# Patient Record
Sex: FEMALE | Race: WHITE | NOT HISPANIC OR LATINO | Employment: FULL TIME | URBAN - METROPOLITAN AREA
[De-identification: names, ages, dates, MRNs, and addresses within clinical notes are randomized per-mention and may not be internally consistent; named-entity substitution may affect disease eponyms.]

---

## 2021-06-16 ENCOUNTER — OFFICE VISIT (OUTPATIENT)
Dept: FAMILY MEDICINE CLINIC | Facility: CLINIC | Age: 31
End: 2021-06-16
Payer: COMMERCIAL

## 2021-06-16 VITALS
DIASTOLIC BLOOD PRESSURE: 80 MMHG | SYSTOLIC BLOOD PRESSURE: 110 MMHG | HEART RATE: 88 BPM | WEIGHT: 124.2 LBS | HEIGHT: 61 IN | OXYGEN SATURATION: 99 % | RESPIRATION RATE: 14 BRPM | BODY MASS INDEX: 23.45 KG/M2 | TEMPERATURE: 97.6 F

## 2021-06-16 DIAGNOSIS — J01.10 ACUTE NON-RECURRENT FRONTAL SINUSITIS: Primary | ICD-10-CM

## 2021-06-16 PROCEDURE — 99203 OFFICE O/P NEW LOW 30 MIN: CPT | Performed by: FAMILY MEDICINE

## 2021-06-16 RX ORDER — TRIAMCINOLONE ACETONIDE 1 MG/G
CREAM TOPICAL 2 TIMES DAILY
COMMUNITY

## 2021-06-16 RX ORDER — FLUCONAZOLE 150 MG/1
150 TABLET ORAL ONCE
Qty: 1 TABLET | Refills: 0 | Status: SHIPPED | OUTPATIENT
Start: 2021-06-16 | End: 2021-06-16

## 2021-06-16 RX ORDER — VALACYCLOVIR HYDROCHLORIDE 500 MG/1
500 TABLET, FILM COATED ORAL AS NEEDED
COMMUNITY
Start: 2021-05-07

## 2021-06-16 RX ORDER — AMOXICILLIN AND CLAVULANATE POTASSIUM 875; 125 MG/1; MG/1
1 TABLET, FILM COATED ORAL EVERY 12 HOURS SCHEDULED
Qty: 14 TABLET | Refills: 0 | Status: SHIPPED | OUTPATIENT
Start: 2021-06-16 | End: 2021-06-23

## 2021-06-16 RX ORDER — NORETHINDRONE ACETATE AND ETHINYL ESTRADIOL, ETHINYL ESTRADIOL AND FERROUS FUMARATE 1MG-10(24)
1 KIT ORAL DAILY
COMMUNITY
Start: 2021-03-30

## 2021-06-16 RX ORDER — ALPRAZOLAM 0.25 MG/1
0.25 TABLET ORAL AS NEEDED
COMMUNITY
Start: 2021-05-05

## 2021-06-16 RX ORDER — FLUTICASONE PROPIONATE 50 MCG
1 SPRAY, SUSPENSION (ML) NASAL DAILY
Qty: 9.9 ML | Refills: 0 | Status: SHIPPED | OUTPATIENT
Start: 2021-06-16 | End: 2021-07-08

## 2021-06-16 RX ORDER — ESCITALOPRAM OXALATE 20 MG/1
20 TABLET ORAL DAILY
COMMUNITY
Start: 2021-05-15

## 2021-06-16 NOTE — PROGRESS NOTES
Assessment/Plan:    1  Acute non-recurrent frontal sinusitis  -     amoxicillin-clavulanate (Augmentin) 875-125 mg per tablet; Take 1 tablet by mouth every 12 (twelve) hours for 7 days  -     fluconazole (DIFLUCAN) 150 mg tablet; Take 1 tablet (150 mg total) by mouth once for 1 dose  -     fluticasone (FLONASE) 50 mcg/act nasal spray; 1 spray into each nostril daily    Advised to try flonase, if no improvement augmentin given  Side effects and precautions reviewed  Patient states gets yeast infection after abx, would like sometimes  One time dose given  If worsening symptoms, patient should call  Due for physical            Return if symptoms worsen or fail to improve  Subjective:      Patient ID: Amanda Salazar is a 31 y o  female  Chief Complaint   Patient presents with    sinus pressure/pain     ear pressure headache, irritates throat from post nasal drip       HPI  Sinusitis  Sinus Pain  Patient complains of facial pain, headaches, nasal congestion, post nasal drip, sinus pressure and ear pain, eye pain on both sides   Onset of symptoms was 5 days ago  Symptoms have been gradually worsening since that time  She is drinking plenty of fluids  Past history is significant for nothing  Patient is non-smoker  Tried tylenol sinus and headache with no relief  Has had a sinus infection prior  The following portions of the patient's history were reviewed and updated as appropriate: allergies, current medications, past family history, past medical history, past social history, past surgical history and problem list     Review of Systems   Constitutional: Negative for appetite change and fever  HENT: Positive for congestion, ear pain, postnasal drip, sinus pressure, sinus pain and sore throat  Eyes: Negative for visual disturbance  Respiratory: Negative for cough and shortness of breath  Cardiovascular: Negative for chest pain and leg swelling  Gastrointestinal: Positive for nausea   Negative for abdominal pain, diarrhea and vomiting  Genitourinary: Negative for dysuria  Skin: Negative for color change  Neurological: Positive for headaches  Negative for dizziness, tremors and light-headedness  Psychiatric/Behavioral: Negative for agitation and behavioral problems  Current Outpatient Medications   Medication Sig Dispense Refill    ALPRAZolam (XANAX) 0 25 mg tablet Take 0 25 mg by mouth as needed      escitalopram (LEXAPRO) 20 mg tablet Take 20 mg by mouth daily      hydrocortisone 2 5 % ointment APPLY A THIN LAYER TO THE AFFECTED AREA(S) 2 TIMES A DAY MON FRI, STOP WHEN CLEAR      Lo Loestrin Fe 1 MG-10 MCG / 10 MCG TABS Take 1 tablet by mouth daily      triamcinolone (KENALOG) 0 1 % cream Apply topically 2 (two) times a day      valACYclovir (VALTREX) 500 mg tablet Take 500 mg by mouth as needed      amoxicillin-clavulanate (Augmentin) 875-125 mg per tablet Take 1 tablet by mouth every 12 (twelve) hours for 7 days 14 tablet 0    fluticasone (FLONASE) 50 mcg/act nasal spray 1 spray into each nostril daily 9 9 mL 0     No current facility-administered medications for this visit  Objective:    /80 (BP Location: Left arm, Patient Position: Sitting, Cuff Size: Standard)   Pulse 88   Temp 97 6 °F (36 4 °C) (Temporal)   Resp 14   Ht 5' 1" (1 549 m)   Wt 56 3 kg (124 lb 3 2 oz)   SpO2 99%   BMI 23 47 kg/m²        Physical Exam  Constitutional:       General: She is not in acute distress  Appearance: She is well-developed  HENT:      Head: Normocephalic and atraumatic  Right Ear: Tympanic membrane and external ear normal       Left Ear: Tympanic membrane and external ear normal       Nose: Nose normal       Mouth/Throat:      Pharynx: Oropharynx is clear  No oropharyngeal exudate  Eyes:      General:         Right eye: No discharge  Left eye: No discharge        Conjunctiva/sclera: Conjunctivae normal    Cardiovascular:      Rate and Rhythm: Normal rate and regular rhythm  Pulses: Normal pulses  Heart sounds: Normal heart sounds  No murmur heard  Pulmonary:      Effort: Pulmonary effort is normal  No respiratory distress  Breath sounds: Normal breath sounds  Abdominal:      General: Bowel sounds are normal  There is no distension  Palpations: Abdomen is soft  Tenderness: There is no abdominal tenderness  Musculoskeletal:         General: Normal range of motion  Cervical back: Normal range of motion and neck supple  Right lower leg: No edema  Left lower leg: No edema  Lymphadenopathy:      Cervical: No cervical adenopathy  Skin:     General: Skin is warm  Neurological:      Mental Status: She is alert and oriented to person, place, and time  Mental status is at baseline     Psychiatric:         Mood and Affect: Mood normal          Behavior: Behavior normal                 Kiara Paz MD

## 2021-06-28 ENCOUNTER — OFFICE VISIT (OUTPATIENT)
Dept: FAMILY MEDICINE CLINIC | Facility: CLINIC | Age: 31
End: 2021-06-28
Payer: COMMERCIAL

## 2021-06-28 VITALS
DIASTOLIC BLOOD PRESSURE: 72 MMHG | SYSTOLIC BLOOD PRESSURE: 114 MMHG | HEIGHT: 61 IN | BODY MASS INDEX: 23.45 KG/M2 | TEMPERATURE: 98.2 F | OXYGEN SATURATION: 98 % | WEIGHT: 124.2 LBS | RESPIRATION RATE: 16 BRPM | HEART RATE: 91 BPM

## 2021-06-28 DIAGNOSIS — H65.93 FLUID LEVEL BEHIND TYMPANIC MEMBRANE OF BOTH EARS: Primary | ICD-10-CM

## 2021-06-28 DIAGNOSIS — J34.2 DEVIATED SEPTUM: ICD-10-CM

## 2021-06-28 DIAGNOSIS — J32.9 SINUSITIS, UNSPECIFIED CHRONICITY, UNSPECIFIED LOCATION: ICD-10-CM

## 2021-06-28 PROCEDURE — 99213 OFFICE O/P EST LOW 20 MIN: CPT | Performed by: FAMILY MEDICINE

## 2021-06-28 RX ORDER — METHYLPREDNISOLONE 4 MG/1
TABLET ORAL
Qty: 21 EACH | Refills: 0 | Status: SHIPPED | OUTPATIENT
Start: 2021-06-28 | End: 2022-02-01

## 2021-06-28 RX ORDER — DOXYCYCLINE HYCLATE 100 MG/1
100 CAPSULE ORAL EVERY 12 HOURS SCHEDULED
Qty: 14 CAPSULE | Refills: 0 | Status: SHIPPED | OUTPATIENT
Start: 2021-06-28 | End: 2021-07-05

## 2021-06-28 RX ORDER — FLUCONAZOLE 150 MG/1
TABLET ORAL
COMMUNITY
Start: 2021-06-16 | End: 2021-12-10

## 2021-06-28 NOTE — PROGRESS NOTES
Hanna Gordon 1990 female MRN: [id number]    [address] [place] [address]      ASSESSMENT/PLAN  Hanna Gordon is a 31 y o  female presents to the office for    Diagnoses and all orders for this visit:    Fluid level behind tympanic membrane of both ears  -     Ambulatory Referral to Otolaryngology; Future  -     doxycycline hyclate (VIBRAMYCIN) 100 mg capsule; Take 1 capsule (100 mg total) by mouth every 12 (twelve) hours for 7 days  -     methylPREDNISolone 4 MG tablet therapy pack; Use as directed on package    Sinusitis, unspecified chronicity, unspecified location  -     Ambulatory Referral to Otolaryngology; Future  -     doxycycline hyclate (VIBRAMYCIN) 100 mg capsule; Take 1 capsule (100 mg total) by mouth every 12 (twelve) hours for 7 days  -     methylPREDNISolone 4 MG tablet therapy pack; Use as directed on package    Deviated septum    Other orders  -     fluconazole (DIFLUCAN) 150 mg tablet       Sinus Infection:  - Started on Doxy BID x 7days  - Use Flonase 1 spray in each nostril for 7 days  - Start taking Xzyal 5mg x 7 days  - Encourage to take hot shower to help with congestion   - recommend establishing with ENT given her history of nose trauma    If symptoms worsen to please contact the office  No future appointments  SUBJECTIVE  CC: Sore Throat (sinus, post nasal drip yellow/green, right ear pain)      HPI:  Hanna Gordon is a 31 y o  female who presents for  An acute appointment  Patient recently seen 2 weeks ago by her PCP for an acute sinus infection and was placed on Augmentin  Patient states that her symptoms have only worsen in the last couple of days  She states that she has had sinus congestion cause postnasal drip with yellow-green discharge  Also continue to have significant right ear pain  Patient has been religiously taking Zyrtec for multiple years    Currently not breast-feeding with her   Patient states that she several years ago she has had up to 3 surgeries to repair her nose after trauma never fully regained full access of her nasal canals  Review of Systems   Constitutional: Positive for activity change and fatigue  Negative for appetite change, chills and fever  HENT: Positive for congestion, postnasal drip and sore throat  Respiratory: Positive for cough  Negative for chest tightness and shortness of breath  Cardiovascular: Negative for chest pain and leg swelling  Gastrointestinal: Negative for abdominal distention, abdominal pain, constipation, diarrhea, nausea and vomiting  All other systems reviewed and are negative        Historical Information   The patient history was reviewed as follows:  Past Medical History:   Diagnosis Date    Endometriosis     Gestational diabetes 2020    during pregnancy         Medications:     Current Outpatient Medications:     ALPRAZolam (XANAX) 0 25 mg tablet, Take 0 25 mg by mouth as needed, Disp: , Rfl:     escitalopram (LEXAPRO) 20 mg tablet, Take 20 mg by mouth daily, Disp: , Rfl:     fluticasone (FLONASE) 50 mcg/act nasal spray, 1 spray into each nostril daily, Disp: 9 9 mL, Rfl: 0    hydrocortisone 2 5 % ointment, APPLY A THIN LAYER TO THE AFFECTED AREA(S) 2 TIMES A DAY , STOP WHEN CLEAR, Disp: , Rfl:     Lo Loestrin Fe 1 MG-10 MCG / 10 MCG TABS, Take 1 tablet by mouth daily, Disp: , Rfl:     triamcinolone (KENALOG) 0 1 % cream, Apply topically 2 (two) times a day, Disp: , Rfl:     valACYclovir (VALTREX) 500 mg tablet, Take 500 mg by mouth as needed, Disp: , Rfl:     fluconazole (DIFLUCAN) 150 mg tablet, , Disp: , Rfl:     Allergies   Allergen Reactions    Nuts - Food Allergy Facial Swelling       OBJECTIVE  Vitals:   Vitals:    21 0934   BP: 114/72   BP Location: Left arm   Patient Position: Sitting   Cuff Size: Standard   Pulse: 91   Resp: 16   Temp: 98 2 °F (36 8 °C)   TempSrc: Temporal SpO2: 98%   Weight: 56 3 kg (124 lb 3 2 oz)   Height: 5' 1" (1 549 m)         Physical Exam  Vitals reviewed  Constitutional:       Appearance: She is well-developed  HENT:      Head: Normocephalic and atraumatic  Right Ear: External ear normal  A middle ear effusion is present  Left Ear: External ear normal  A middle ear effusion is present  Nose: Septal deviation and mucosal edema present  Right Sinus: Frontal sinus tenderness present  Left Sinus: Frontal sinus tenderness present  Mouth/Throat:      Pharynx: Uvula midline  Posterior oropharyngeal erythema present  No oropharyngeal exudate  Eyes:      Conjunctiva/sclera: Conjunctivae normal       Pupils: Pupils are equal, round, and reactive to light  Cardiovascular:      Rate and Rhythm: Normal rate and regular rhythm  Heart sounds: Normal heart sounds  No murmur heard  Pulmonary:      Breath sounds: Normal breath sounds  Abdominal:      General: Bowel sounds are normal       Palpations: Abdomen is soft  Tenderness: There is no abdominal tenderness  Musculoskeletal:         General: Normal range of motion  Cervical back: Normal range of motion and neck supple  Skin:     General: Skin is warm and dry  Neurological:      Mental Status: She is alert and oriented to person, place, and time  Psychiatric:         Behavior: Behavior normal          Thought Content:  Thought content normal          Judgment: Judgment normal                     Jodie aGrcia MD,   [facility]  6/28/2021

## 2021-07-01 ENCOUNTER — OFFICE VISIT (OUTPATIENT)
Dept: OTOLARYNGOLOGY | Facility: CLINIC | Age: 31
End: 2021-07-01
Payer: COMMERCIAL

## 2021-07-01 VITALS — BODY MASS INDEX: 23.22 KG/M2 | HEIGHT: 61 IN | TEMPERATURE: 98.7 F | WEIGHT: 123 LBS

## 2021-07-01 DIAGNOSIS — M26.621 ARTHRALGIA OF RIGHT TEMPOROMANDIBULAR JOINT: ICD-10-CM

## 2021-07-01 DIAGNOSIS — J32.9 SINUSITIS, UNSPECIFIED CHRONICITY, UNSPECIFIED LOCATION: ICD-10-CM

## 2021-07-01 DIAGNOSIS — J34.89 NASAL VALVE STENOSIS: ICD-10-CM

## 2021-07-01 DIAGNOSIS — J34.2 NASAL SEPTAL DEVIATION: ICD-10-CM

## 2021-07-01 DIAGNOSIS — H65.93 FLUID LEVEL BEHIND TYMPANIC MEMBRANE OF BOTH EARS: ICD-10-CM

## 2021-07-01 DIAGNOSIS — J32.9 CHRONIC SINUSITIS, UNSPECIFIED LOCATION: Primary | ICD-10-CM

## 2021-07-01 PROCEDURE — 31231 NASAL ENDOSCOPY DX: CPT | Performed by: OTOLARYNGOLOGY

## 2021-07-01 PROCEDURE — 3008F BODY MASS INDEX DOCD: CPT | Performed by: OTOLARYNGOLOGY

## 2021-07-01 PROCEDURE — 99204 OFFICE O/P NEW MOD 45 MIN: CPT | Performed by: OTOLARYNGOLOGY

## 2021-07-01 PROCEDURE — 1036F TOBACCO NON-USER: CPT | Performed by: OTOLARYNGOLOGY

## 2021-07-01 NOTE — PROGRESS NOTES
[facility]  [facility]  [facility]      Otolaryngology -- Head and Neck Surgery New Patient Visit    Leticia Yao is a 31 y o  who presents with a chief complaint of sinus problems    Pertinent elements of the history include:  She presents with sinus problems  Had a recent sinus infection, treated with Augmentin  Associated fluid in her ears -- a first time problem  Then treated with a medrol dosepak and doxycycline  Also noted to have a deviated septum -- has had prior CRNF from a broken nose -- residual deviation following this  Then had a septoplasty and sinus surgery  No rhinoplasty  She has had sinus pressure and pain for years  Associated migraines  Had balloon sinuplasty at the time of her septoplasty without any improvement in sinus symptoms  Sinus pressure and pain has improved (temporarily) after a recent course of steroids and Abx  She also takes Flonase daily  Has used saline irrigations in the past as well  Review of systems: Pertinent review of systems documented in the HPI  10 point ROS documented in a separate note, as necessary  Results reviewed; images from any scan have been personally reviewed: The past medical, surgical, social and family history have been reviewed as documented in today's record  Past Medical History:   Diagnosis Date    Endometriosis 2011    Gestational diabetes 01/27/2020    during pregnancy       Past Surgical History:   Procedure Laterality Date    SEPTOPLASTY  2007    Limited FESS       History reviewed  No pertinent family history      Social History     Socioeconomic History    Marital status: /Civil Union     Spouse name: Not on file    Number of children: Not on file    Years of education: Not on file    Highest education level: Not on file   Occupational History    Not on file   Tobacco Use    Smoking status: Never Smoker    Smokeless tobacco: Never Used   Vaping Use    Vaping Use: Never used   Substance and Sexual Activity    Alcohol use: Yes    Drug use: Never    Sexual activity: Not on file   Other Topics Concern    Not on file   Social History Narrative    Not on file     Social Determinants of Health     Financial Resource Strain:     Difficulty of Paying Living Expenses:    Food Insecurity:     Worried About Running Out of Food in the Last Year:     [address] in the Last Year:    Transportation Needs:     Lack of Transportation (Medical):      Lack of Transportation (Non-Medical):    Physical Activity:     Days of Exercise per Week:     Minutes of Exercise per Session:    Stress:     Feeling of Stress :    Social Connections:     Frequency of Communication with Friends and Family:     Frequency of Social Gatherings with Friends and Family:     Attends Congregational Services:     Active Member of Clubs or Organizations:     Attends Club or Organization Meetings:     Marital Status:    Intimate Partner Violence:     Fear of Current or Ex-Partner:     Emotionally Abused:     Physically Abused:     Sexually Abused:        Current Outpatient Medications on File Prior to Visit   Medication Sig Dispense Refill    ALPRAZolam (XANAX) 0 25 mg tablet Take 0 25 mg by mouth as needed      doxycycline hyclate (VIBRAMYCIN) 100 mg capsule Take 1 capsule (100 mg total) by mouth every 12 (twelve) hours for 7 days 14 capsule 0    escitalopram (LEXAPRO) 20 mg tablet Take 20 mg by mouth daily      fluticasone (FLONASE) 50 mcg/act nasal spray 1 spray into each nostril daily 9 9 mL 0    hydrocortisone 2 5 % ointment APPLY A THIN LAYER TO THE AFFECTED AREA(S) 2 TIMES A DAY MON FRI, STOP WHEN CLEAR      Lo Loestrin Fe 1 MG-10 MCG / 10 MCG TABS Take 1 tablet by mouth daily      methylPREDNISolone 4 MG tablet therapy pack Use as directed on package 21 each 0    triamcinolone (KENALOG) 0 1 % cream Apply topically 2 (two) times a day      valACYclovir (VALTREX) 500 mg tablet Take 500 mg by mouth as needed      fluconazole (DIFLUCAN) 150 mg tablet  (Patient not taking: Reported on 7/1/2021)       No current facility-administered medications on file prior to visit  Physical exam:   Temp 98 7 °F (37 1 °C) (Temporal)   Ht 5' 1" (1 549 m)   Wt 55 8 kg (123 lb)   BMI 23 24 kg/m²     Constitutional:  Well developed, well nourished and groomed, in no acute distress  Eyes:  Extra-ocular movements intact, pupils equally round and reactive to light and accommodation, the lids and conjunctivae are normal in appearance  Head: Atraumatic, normocephalic, no visible scalp lesions, bony palpation unremarkable without stepoffs, parotid and submandibular salivary glands non-tender to palpation and without masses bilaterally  Ears:  Auricles normal in appearance bilaterally, mastoid prominence non-tender, external auditory canals clear bilaterally  Tympanic membranes intact  Normal appearing ossicles  Nose/Sinuses:  External appearance notable for collapse of the right internal nasal valve and left external nasal valve stenosis, no maxillary or frontal sinus tenderness to palpation bilaterally  Anterior rhinoscopy reveals: residual septal deflection, normal appearing mucosa     Oral Cavity:  Moist mucus membranes, gums and dentition unremarkable, no oral mucosal masses or lesions, floor of mouth soft, tongue mobile without masses or lesions  Oropharynx:  Base of tongue soft and without masses, tonsils bilaterally unremarkable, soft palate mucosa unremarkable  Right TMJ TTP  Neck:  No visible or palpable cervical lesions or lymphadenopathy, thyroid gland is normal in size and symmetry and without masses, normal laryngeal elevation with swallowing  Cardiovascular:  Normal rate and rhythm, no palpable thrills, no jugulovenous distension observed  Respiratory:  Normal respiratory effort without evidence of retractions or use of accessory muscles    Integument: Normal appearing without observed masses or lesions  Neurologic:  Cranial nerves II-XII intact bilaterally  Psychiatric:  Alert and oriented to time, place and person, normal affect  Procedures  The nasal cavities were decongested with lidocaine and oxymetazoline spray  Bilateral nasal endoscopy was performed as follows: Location: bilateral nasal cavity  Endoscopy type: flexible  Results: normal mucosa, no masses, exudates or polyps  No evidence of prior sinus surgery  The patient tolerated the procedure well  Assessment:   1  Chronic sinusitis, unspecified location  CT sinus wo contrast   2  Fluid level behind tympanic membrane of both ears  Ambulatory Referral to Otolaryngology   3  Sinusitis, unspecified chronicity, unspecified location  Ambulatory Referral to Otolaryngology   4  Nasal septal deviation     5  Nasal valve stenosis     6  Arthralgia of right temporomandibular joint         Orders  Orders Placed This Encounter   Procedures    CT sinus wo contrast     Standing Status:   Future     Standing Expiration Date:   7/1/2025     Scheduling Instructions: There is no prep for this study  Please bring your insurance cards, a form of photo ID and a list of your medications with you  Arrive 15 minutes prior to your appointment time to register  On the day of your test, please bring any prior CT or MRI studies of this area with you that were not performed at a [facility]  To schedule this appointment, please contact Central Scheduling at [phone]  Order Specific Question:   Is the patient pregnant? Answer:   No     Order Specific Question:   What is the patient's sedation requirement? Answer:   No Sedation     Order Specific Question:   Release to patient through Mychart     Answer:   Immediate     Order Specific Question:   Reason for Exam (FREE TEXT)     Answer:   chronic sinusitis         Discussion/Plan:    1  She has several issues:  I suspect her right ear symptoms are due to TMJ arthralgia  There was some TTP on exam today and she does have a history of bruxism  TMJ recommendations given  2  Her sinus pressure and pain has been treated with appropriate medical management  I recommended a CT sinus to differentiate sinusitis from migraine    3  She has structural issues related to nasal function and appearance and would benefit from revision septorhinoplasty  I will refer her to Dr Aiden Lyons once we get her other sinus related issues sorted out    4  Follow up after the CT

## 2021-07-01 NOTE — PATIENT INSTRUCTIONS
Temporomandibular Joint (TMJ) Dysfunction and Myofascial Pain Syndrome    Definitions:    Temporomandibular joint dysfunction: abnormal movements of the jaw or pain associated with jaw movement such as with chewing or opening the mouth   Myofascial pain syndrome: inflammation with associated pain and stiffness of the muscles surrounding the TMJ    Anatomy of the TMJ (see attached image): 1  hinging joint connecting the jaw (mandible) to the skull  2  bones attached to the joint along with their cartilages  3  a small cartilage disk (shock absorber)     Risk factors for TMJ dysfunction:   Arthritis   Injury to the joint or joint dislocation   Grinding the teeth   Clenching the teeth   Connective tissue disorders (rheumatologic disease)  Symptoms:   pain (throbbing/aching, sharp/shooting) at the TMJ (in front of the ear)   ear pain or earache   temporal headaches    pain under the jaw (mandible)   pain behind the teeth  *The pain associated with TMJ often can be reproduced by pressing in the location of the pain      How to manage an acute flare-up:   Dental guard/oral appliance - cushion for the teeth at night to minimize effects of clenching and grinding teeth while asleep; also prevents gum-on-gum effect from overextending the TMJ in patients without teeth   No gum chewing   No eating chewy foods   Moist heat to affected joint   Massage to the joint and surrounding muscles    Avoid excessive mouth opening such as with yawning or taking a large bite of food   TMJ physical therapy (PT) - a referral to a PT who specializes in TMJ therapy can be made   Evaluation of your bite (occlusion) by your dentist   Evaluation of the TMJ by an oral surgeon  o In the more severe cases, surgical management may be considered:  - Arthrocentesis - irrigating the joint  - Joint injection - steroid injection  - TMJ arthroscopy - minimally invasive surgery in the joint using cameras  - Modified condylotomy  - Open-joint surgery - last resort for the most severe cases   Medications   1  Pain relievers and anti-inflammatories  - Acetaminophen  - Non-steroidal anti-inflammatory (NSAID) like ibuprofen (PREFERRED)   2  Tricyclic antidepressants  These medications, such as amitriptyline and nortriptyline, are used mostly for depression, but in low doses, they are sometimes used for pain relief, control of clenching and grinding, and sleeplessness  3  Muscle relaxants   These medications are sometimes used for a few days or weeks to help relieve sudden pain caused by TMJ disorders due to muscle spasms    Prevention strategies:   See your dentist routinely  [address] guard/oral appliance nightly   Manage stress including relaxation techniques, behavioral modification, and biofeedback   Avoid gum chewing - consider alternatives such as mints, lozenges, or mouth rinses   Avoid eating chewy foods   Treat other inflammatory disease optimally (ex: rheumatoid arthritis, connective tissue disease)

## 2021-07-08 ENCOUNTER — HOSPITAL ENCOUNTER (OUTPATIENT)
Dept: RADIOLOGY | Facility: HOSPITAL | Age: 31
Discharge: HOME | End: 2021-07-08
Attending: OTOLARYNGOLOGY
Payer: COMMERCIAL

## 2021-07-08 DIAGNOSIS — J01.10 ACUTE NON-RECURRENT FRONTAL SINUSITIS: ICD-10-CM

## 2021-07-08 DIAGNOSIS — J32.9 CHRONIC SINUSITIS, UNSPECIFIED LOCATION: ICD-10-CM

## 2021-07-08 PROCEDURE — 70486 CT MAXILLOFACIAL W/O DYE: CPT

## 2021-07-08 RX ORDER — FLUTICASONE PROPIONATE 50 MCG
SPRAY, SUSPENSION (ML) NASAL
Qty: 16 ML | Refills: 2 | Status: SHIPPED | OUTPATIENT
Start: 2021-07-08 | End: 2022-01-04

## 2021-08-12 ENCOUNTER — OFFICE VISIT (OUTPATIENT)
Dept: OTOLARYNGOLOGY | Facility: CLINIC | Age: 31
End: 2021-08-12
Payer: COMMERCIAL

## 2021-08-12 VITALS
HEART RATE: 79 BPM | WEIGHT: 121 LBS | HEIGHT: 61 IN | BODY MASS INDEX: 22.84 KG/M2 | TEMPERATURE: 98.5 F | SYSTOLIC BLOOD PRESSURE: 112 MMHG | DIASTOLIC BLOOD PRESSURE: 84 MMHG

## 2021-08-12 DIAGNOSIS — J34.89 NASAL VALVE STENOSIS: ICD-10-CM

## 2021-08-12 DIAGNOSIS — M26.621 ARTHRALGIA OF RIGHT TEMPOROMANDIBULAR JOINT: ICD-10-CM

## 2021-08-12 DIAGNOSIS — J34.2 NASAL SEPTAL DEVIATION: Primary | ICD-10-CM

## 2021-08-12 PROCEDURE — 3008F BODY MASS INDEX DOCD: CPT | Performed by: OTOLARYNGOLOGY

## 2021-08-12 PROCEDURE — 99214 OFFICE O/P EST MOD 30 MIN: CPT | Performed by: OTOLARYNGOLOGY

## 2021-08-12 PROCEDURE — 1036F TOBACCO NON-USER: CPT | Performed by: OTOLARYNGOLOGY

## 2021-08-12 NOTE — PROGRESS NOTES
Otolaryngology-- Head and Neck Surgery Follow up visit      CC: sinus pain, nasal obstruction      Time interval of problem since last visit:  6 weeks    Pertinent interval elements of the history:  Eugenia Returns to review her CT sinus  The images demonstrate clear paranasal sinuses bilaterally with a residual right septal deflection of the perpendicular plate of the ethmoid with a contact point between a bone spur and the right inferior turbinate  For her part, she continues to experience predominantly right-sided sinus pressure and pain radiating to the forehead and the jaw with right sided conjunctival erythema  Review of any relevant imaging:      Interval Review of systems: Pertinent review of systems documented in the HPI  Interval Social History:  Social History     Socioeconomic History    Marital status: /Civil Union     Spouse name: Not on file    Number of children: Not on file    Years of education: Not on file    Highest education level: Not on file   Occupational History    Not on file   Tobacco Use    Smoking status: Never Smoker    Smokeless tobacco: Never Used   Vaping Use    Vaping Use: Never used   Substance and Sexual Activity    Alcohol use: Yes    Drug use: Never    Sexual activity: Not on file   Other Topics Concern    Not on file   Social History Narrative    Not on file     Social Determinants of Health     Financial Resource Strain:     Difficulty of Paying Living Expenses:    Food Insecurity:     Worried About Running Out of Food in the Last Year:     [address] in the Last Year:    Transportation Needs:     Lack of Transportation (Medical):      Lack of Transportation (Non-Medical):    Physical Activity:     Days of Exercise per Week:     Minutes of Exercise per Session:    Stress:     Feeling of Stress :    Social Connections:     Frequency of Communication with Friends and Family:     Frequency of Social Gatherings with Friends and Family:  Attends Holiness Services:     Active Member of Clubs or Organizations:     Attends Club or Organization Meetings:     Marital Status:    Intimate Partner Violence:     Fear of Current or Ex-Partner:     Emotionally Abused:     Physically Abused:     Sexually Abused:        Interval Physical Examination:  /84   Pulse 79   Temp 98 5 °F (36 9 °C)   Ht 5' 1" (1 549 m)   Wt 54 9 kg (121 lb)   BMI 22 86 kg/m²   NAD  Nasal: Notable for a twisted nasal deformity    Procedure:      Assessment:  1  Nasal septal deviation     2  Nasal valve stenosis     3  Arthralgia of right temporomandibular joint         Plan:  1  We had a detailed discussion about her CT findings  I gave her reassurance that her symptoms are not due to chronic or recurrent acute sinusitis  She does have nasal obstruction  She has had 2 previous septoplasty's, neither of which address the posterior deflection and bone spur  Additionally this is causing a contact point between the septum and the inferior turbinate which is likely a contributing factor to her ongoing sinus pain  I recommended a revision endoscopic septoplasty to remove the deviated perpendicular plate of the ethmoid and contact point  This will improve her nasal obstruction which tends to actually be worse on the left than the right  I also counseled her that we will hopefully remove a trigger for her ongoing daily facial pain  As an alternative to surgery we also discussed medical management with nortriptyline or Topamax  She cannot take Nortriptyline because of her history of kidney stones  We talked about the side effects of Topamax and she was reluctant to take that as well  We also discussed the need for revision rhinoplasty to address the twisted nose deformity  I gave her the name of my colleague Dr Sharon Pierce for consideration of a functional rhinoplasty    She will consider the option for revision endoscopic septoplasty and call to schedule surgery if she desires it  An alternative would be a joint septorhinoplasty with my partner

## 2021-10-22 ENCOUNTER — TELEPHONE (OUTPATIENT)
Dept: OTOLARYNGOLOGY | Facility: CLINIC | Age: 31
End: 2021-10-22

## 2021-10-28 DIAGNOSIS — G43.709 CHRONIC MIGRAINE WITHOUT AURA WITHOUT STATUS MIGRAINOSUS, NOT INTRACTABLE: Primary | ICD-10-CM

## 2021-10-28 RX ORDER — NORTRIPTYLINE HYDROCHLORIDE 10 MG/1
10 CAPSULE ORAL
Qty: 30 CAPSULE | Refills: 1 | Status: SHIPPED | OUTPATIENT
Start: 2021-10-28 | End: 2021-11-08

## 2021-11-08 ENCOUNTER — TELEPHONE (OUTPATIENT)
Dept: OTOLARYNGOLOGY | Facility: CLINIC | Age: 31
End: 2021-11-08

## 2021-12-10 ENCOUNTER — OFFICE VISIT (OUTPATIENT)
Dept: FAMILY MEDICINE CLINIC | Facility: CLINIC | Age: 31
End: 2021-12-10
Payer: COMMERCIAL

## 2021-12-10 VITALS
TEMPERATURE: 98 F | SYSTOLIC BLOOD PRESSURE: 120 MMHG | HEIGHT: 61 IN | WEIGHT: 130 LBS | HEART RATE: 78 BPM | BODY MASS INDEX: 24.55 KG/M2 | RESPIRATION RATE: 16 BRPM | DIASTOLIC BLOOD PRESSURE: 80 MMHG

## 2021-12-10 DIAGNOSIS — H01.001 BLEPHARITIS OF RIGHT UPPER EYELID, UNSPECIFIED TYPE: Primary | ICD-10-CM

## 2021-12-10 PROCEDURE — 99213 OFFICE O/P EST LOW 20 MIN: CPT | Performed by: FAMILY MEDICINE

## 2021-12-10 PROCEDURE — 3008F BODY MASS INDEX DOCD: CPT | Performed by: FAMILY MEDICINE

## 2021-12-10 PROCEDURE — 1036F TOBACCO NON-USER: CPT | Performed by: FAMILY MEDICINE

## 2021-12-10 RX ORDER — AMOXICILLIN AND CLAVULANATE POTASSIUM 875; 125 MG/1; MG/1
1 TABLET, FILM COATED ORAL EVERY 12 HOURS SCHEDULED
Qty: 14 TABLET | Refills: 0 | Status: SHIPPED | OUTPATIENT
Start: 2021-12-10 | End: 2021-12-17

## 2021-12-10 RX ORDER — ERYTHROMYCIN 5 MG/G
0.5 OINTMENT OPHTHALMIC
Qty: 3.5 G | Refills: 0 | Status: SHIPPED | OUTPATIENT
Start: 2021-12-10 | End: 2022-02-08

## 2021-12-13 ENCOUNTER — TELEPHONE (OUTPATIENT)
Dept: FAMILY MEDICINE CLINIC | Facility: CLINIC | Age: 31
End: 2021-12-13

## 2022-01-04 DIAGNOSIS — J01.10 ACUTE NON-RECURRENT FRONTAL SINUSITIS: ICD-10-CM

## 2022-01-04 RX ORDER — FLUTICASONE PROPIONATE 50 MCG
SPRAY, SUSPENSION (ML) NASAL
Qty: 16 ML | Refills: 1 | Status: SHIPPED | OUTPATIENT
Start: 2022-01-04

## 2022-02-01 ENCOUNTER — TELEMEDICINE (OUTPATIENT)
Dept: FAMILY MEDICINE CLINIC | Facility: CLINIC | Age: 32
End: 2022-02-01
Payer: COMMERCIAL

## 2022-02-01 DIAGNOSIS — R11.0 NAUSEA: Primary | ICD-10-CM

## 2022-02-01 DIAGNOSIS — R19.7 DIARRHEA, UNSPECIFIED TYPE: ICD-10-CM

## 2022-02-01 PROCEDURE — 99213 OFFICE O/P EST LOW 20 MIN: CPT | Performed by: FAMILY MEDICINE

## 2022-02-01 RX ORDER — ONDANSETRON 4 MG/1
4 TABLET, ORALLY DISINTEGRATING ORAL EVERY 6 HOURS PRN
Qty: 20 TABLET | Refills: 0 | Status: SHIPPED | OUTPATIENT
Start: 2022-02-01 | End: 2022-02-08

## 2022-02-01 NOTE — PROGRESS NOTES
Virtual Regular Visit    Verification of patient location:    Patient is located in the following state in which I hold an active license [place]      Assessment/Plan:    Problem List Items Addressed This Visit     None      Visit Diagnoses     Nausea    -  Primary    Relevant Medications    ondansetron (Zofran ODT) 4 mg disintegrating tablet    Diarrhea, unspecified type          3pm  Tomorrow testing for COVID HOME and will be notifying us results  Currently recommend treating like gastroenteritis  Zofran as needed  [place] diet  Reason for visit is   Chief Complaint   Patient presents with    Virtual Regular Visit        Encounter provider Jaya Young MD    Provider located at [address] [place] [id number]      Recent Visits  No visits were found meeting these conditions  Showing recent visits within past 7 days and meeting all other requirements  Today's Visits  Date Type Provider Dept   02/01/22 Telemedicine Jaya Young  [facility] today's visits and meeting all other requirements  Future Appointments  No visits were found meeting these conditions  Showing future appointments within next 150 days and meeting all other requirements       The patient was identified by name and date of birth  Maameiggyart Flynn was informed that this is a telemedicine visit and that the visit is being conducted through [employer] and patient was informed that this is not a secure, HIPAA-compliant platform  She agrees to proceed     My office door was closed  No one else was in the room  She acknowledged consent and understanding of privacy and security of the video platform  The patient has agreed to participate and understands they can discontinue the visit at any time  Patient is aware this is a billable service  Tomi Reji is a 27 y o  female presents via video      Her son had appointment on Friday->  Radha cough, not feeling well  Sore throat-> 1/20 went away  1/28 started with sore throat again   1/31/22-> Throwing up, chills, no fever, very tired, Little energy, diarrhea liquid  Cough  Pedialyte       Past Medical History:   Diagnosis Date    Endometriosis 2011    Gestational diabetes 01/27/2020    during pregnancy       Past Surgical History:   Procedure Laterality Date    SEPTOPLASTY  2007    Limited FESS       Current Outpatient Medications   Medication Sig Dispense Refill    ALPRAZolam (XANAX) 0 25 mg tablet Take 0 25 mg by mouth as needed      amitriptyline (ELAVIL) 25 mg tablet Take 1 tablet (25 mg total) by mouth daily at bedtime 30 tablet 3    erythromycin (ILOTYCIN) ophthalmic ointment Administer 0 5 inches to both eyes daily at bedtime 3 5 g 0    escitalopram (LEXAPRO) 20 mg tablet Take 20 mg by mouth daily      fluticasone (FLONASE) 50 mcg/act nasal spray SPRAY 1 SPRAY INTO EACH NOSTRIL EVERY DAY 16 mL 1    hydrocortisone 2 5 % ointment APPLY A THIN LAYER TO THE AFFECTED AREA(S) 2 TIMES A DAY MON FRI, STOP WHEN CLEAR      Lo Loestrin Fe 1 MG-10 MCG / 10 MCG TABS Take 1 tablet by mouth daily      ondansetron (Zofran ODT) 4 mg disintegrating tablet Take 1 tablet (4 mg total) by mouth every 6 (six) hours as needed for nausea or vomiting 20 tablet 0    triamcinolone (KENALOG) 0 1 % cream Apply topically 2 (two) times a day      valACYclovir (VALTREX) 500 mg tablet Take 500 mg by mouth as needed       No current facility-administered medications for this visit  Allergies   Allergen Reactions    Corylus Hives    Nuts - Food Allergy Facial Swelling and Hives       Review of Systems   Constitutional: Positive for chills  Negative for activity change, appetite change, fatigue and fever  HENT: Positive for sore throat  Negative for congestion  Respiratory: Positive for cough  Negative for chest tightness and shortness of breath  Cardiovascular: Negative for chest pain and leg swelling  Gastrointestinal: Positive for diarrhea, nausea and vomiting  Negative for abdominal distention, abdominal pain and constipation  All other systems reviewed and are negative  Video Exam    There were no vitals filed for this visit  Physical Exam  Constitutional:       Appearance: Normal appearance  Pulmonary:      Effort: Pulmonary effort is normal    Musculoskeletal:         General: Normal range of motion  Neurological:      General: No focal deficit present  Mental Status: She is alert and oriented to person, place, and time  Psychiatric:         Mood and Affect: Mood normal          Behavior: Behavior normal          Thought Content: Thought content normal          Judgment: Judgment normal           I spent 15 minutes directly with the patient during this visit    [address] [place] [address] verbally agrees to participate in [employer]  Pt is aware that [employer] could be limited without vital signs or the ability to perform a full hands-on physical Delwin Axon understands she or the provider may request at any time to terminate the video visit and request the patient to seek care or treatment in person

## 2022-02-02 ENCOUNTER — TELEPHONE (OUTPATIENT)
Dept: FAMILY MEDICINE CLINIC | Facility: CLINIC | Age: 32
End: 2022-02-02

## 2022-02-02 NOTE — TELEPHONE ENCOUNTER
Patient continues to have cough with postnasal drip and sore throat    Started on a Z-Mj if no improvement would like her to come into the office for full evaluation she agreed with our plan

## 2022-02-07 ENCOUNTER — TELEPHONE (OUTPATIENT)
Dept: FAMILY MEDICINE CLINIC | Facility: CLINIC | Age: 32
End: 2022-02-07

## 2022-02-07 NOTE — TELEPHONE ENCOUNTER
Dr Chasity Goldstein:    Patient is not feeling better  She finished ABX and is experiencing sore throat/cough/congestion  She wanted to know if you can send something into pharmacy for her?

## 2022-02-08 ENCOUNTER — OFFICE VISIT (OUTPATIENT)
Dept: FAMILY MEDICINE CLINIC | Facility: CLINIC | Age: 32
End: 2022-02-08
Payer: COMMERCIAL

## 2022-02-08 ENCOUNTER — TELEPHONE (OUTPATIENT)
Dept: FAMILY MEDICINE CLINIC | Facility: CLINIC | Age: 32
End: 2022-02-08

## 2022-02-08 VITALS
BODY MASS INDEX: 24.55 KG/M2 | HEART RATE: 78 BPM | DIASTOLIC BLOOD PRESSURE: 82 MMHG | HEIGHT: 61 IN | TEMPERATURE: 98 F | RESPIRATION RATE: 14 BRPM | WEIGHT: 130 LBS | SYSTOLIC BLOOD PRESSURE: 120 MMHG

## 2022-02-08 DIAGNOSIS — J40 BRONCHITIS: Primary | ICD-10-CM

## 2022-02-08 PROCEDURE — 99213 OFFICE O/P EST LOW 20 MIN: CPT | Performed by: FAMILY MEDICINE

## 2022-02-08 RX ORDER — BENZONATATE 200 MG/1
200 CAPSULE ORAL 3 TIMES DAILY PRN
Qty: 20 CAPSULE | Refills: 0 | Status: SHIPPED | OUTPATIENT
Start: 2022-02-08 | End: 2022-02-17

## 2022-02-08 RX ORDER — METHYLPREDNISOLONE 4 MG/1
TABLET ORAL
Qty: 21 EACH | Refills: 0 | Status: SHIPPED | OUTPATIENT
Start: 2022-02-08 | End: 2022-02-17

## 2022-02-08 RX ORDER — ALBUTEROL SULFATE 90 UG/1
2 AEROSOL, METERED RESPIRATORY (INHALATION) EVERY 6 HOURS PRN
Qty: 6.7 G | Refills: 0 | Status: SHIPPED | OUTPATIENT
Start: 2022-02-08

## 2022-02-08 NOTE — PROGRESS NOTES
Tr Taylor 1990 female MRN: [id number]    [address] [facility] - [address]      ASSESSMENT/PLAN  Tr Taylor is a 27 y o  female presents to the office for    Diagnoses and all orders for this visit:    Bronchitis  -     methylPREDNISolone 4 MG tablet therapy pack; Use as directed on package  -     albuterol (Proventil HFA) 90 mcg/act inhaler; Inhale 2 puffs every 6 (six) hours as needed for wheezing  -     benzonatate (TESSALON) 200 MG capsule; Take 1 capsule (200 mg total) by mouth 3 (three) times a day as needed for cough     education given on bronchitis  Treatment shown above           No future appointments  SUBJECTIVE  CC: Sore Throat and Cough (x1 week finished z-pack on Sunday)      HPI:  Tr Taylor is a 27 y o  female who presents for an acute appointment  1 week of cough, and sore throat  Night time is the worse-> with coughing  Patient states that all her symptoms started with diarrhea  Please see previous notes for details     Patient's completed antibiotic  States that her son was the 1 that previously got this infection and was viral infection  XReview of Systems   Constitutional: Negative for activity change, appetite change, chills, fatigue and fever  HENT: Positive for congestion (thick green discharge), ear pain (Right ear> left) and rhinorrhea  Respiratory: Positive for cough and chest tightness ( rib pain from coughing )  Negative for shortness of breath  Cardiovascular: Negative for chest pain and leg swelling  Gastrointestinal: Negative for abdominal distention, abdominal pain, constipation, diarrhea, nausea and vomiting  Neurological: Positive for headaches  All other systems reviewed and are negative        Historical Information   The patient history was reviewed as follows:  Past Medical History:   Diagnosis Date    Endometriosis 2011    Gestational diabetes 01/27/2020    during pregnancy Medications:     Current Outpatient Medications:     ALPRAZolam (XANAX) 0 25 mg tablet, Take 0 25 mg by mouth as needed, Disp: , Rfl:     amitriptyline (ELAVIL) 25 mg tablet, Take 1 tablet (25 mg total) by mouth daily at bedtime, Disp: 30 tablet, Rfl: 3    escitalopram (LEXAPRO) 20 mg tablet, Take 20 mg by mouth daily, Disp: , Rfl:     fluticasone (FLONASE) 50 mcg/act nasal spray, SPRAY 1 SPRAY INTO EACH NOSTRIL EVERY DAY, Disp: 16 mL, Rfl: 1    hydrocortisone 2 5 % ointment, APPLY A THIN LAYER TO THE AFFECTED AREA(S) 2 TIMES A DAY MON FRI, STOP WHEN CLEAR, Disp: , Rfl:     Lo Loestrin Fe 1 MG-10 MCG / 10 MCG TABS, Take 1 tablet by mouth daily, Disp: , Rfl:     triamcinolone (KENALOG) 0 1 % cream, Apply topically 2 (two) times a day, Disp: , Rfl:     valACYclovir (VALTREX) 500 mg tablet, Take 500 mg by mouth as needed, Disp: , Rfl:     albuterol (Proventil HFA) 90 mcg/act inhaler, Inhale 2 puffs every 6 (six) hours as needed for wheezing, Disp: 6 7 g, Rfl: 0    benzonatate (TESSALON) 200 MG capsule, Take 1 capsule (200 mg total) by mouth 3 (three) times a day as needed for cough, Disp: 20 capsule, Rfl: 0    methylPREDNISolone 4 MG tablet therapy pack, Use as directed on package, Disp: 21 each, Rfl: 0    Allergies   Allergen Reactions    Corylus Hives    Nuts - Food Allergy Facial Swelling and Hives       OBJECTIVE  Vitals:   Vitals:    02/08/22 0905   BP: 120/82   BP Location: Left arm   Patient Position: Sitting   Cuff Size: Standard   Pulse: 78   Resp: 14   Temp: 98 °F (36 7 °C)   TempSrc: Temporal   Weight: 59 kg (130 lb)   Height: 5' 1" (1 549 m)         Physical Exam  Vitals reviewed  Constitutional:       Appearance: She is well-developed  HENT:      Head: Normocephalic and atraumatic  Eyes:      Conjunctiva/sclera: Conjunctivae normal       Pupils: Pupils are equal, round, and reactive to light  Cardiovascular:      Rate and Rhythm: Normal rate and regular rhythm        Heart sounds: Normal heart sounds  Pulmonary:      Effort: Pulmonary effort is normal  No respiratory distress  Breath sounds: Wheezing (Worse with exertion     Every time patient had inspiration she would have bronchospasms and going to coughing fits) present  Musculoskeletal:         General: Normal range of motion  Cervical back: Normal range of motion and neck supple  Skin:     General: Skin is warm  Capillary Refill: Capillary refill takes less than 2 seconds  Neurological:      Mental Status: She is alert and oriented to person, place, and time                      Constanza Estevez MD,   [facility]  2/8/2022

## 2022-02-08 NOTE — TELEPHONE ENCOUNTER
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

## 2022-02-17 ENCOUNTER — OFFICE VISIT (OUTPATIENT)
Dept: FAMILY MEDICINE CLINIC | Facility: CLINIC | Age: 32
End: 2022-02-17
Payer: COMMERCIAL

## 2022-02-17 VITALS
TEMPERATURE: 98.2 F | SYSTOLIC BLOOD PRESSURE: 110 MMHG | HEIGHT: 61 IN | RESPIRATION RATE: 16 BRPM | WEIGHT: 128 LBS | BODY MASS INDEX: 24.17 KG/M2 | DIASTOLIC BLOOD PRESSURE: 78 MMHG | OXYGEN SATURATION: 98 % | HEART RATE: 102 BPM

## 2022-02-17 DIAGNOSIS — J40 BRONCHITIS: Primary | ICD-10-CM

## 2022-02-17 DIAGNOSIS — M94.0 COSTOCHONDRITIS: ICD-10-CM

## 2022-02-17 DIAGNOSIS — I49.8 FLUTTERING HEART: ICD-10-CM

## 2022-02-17 DIAGNOSIS — R12 HEART BURN: ICD-10-CM

## 2022-02-17 PROCEDURE — 3725F SCREEN DEPRESSION PERFORMED: CPT | Performed by: FAMILY MEDICINE

## 2022-02-17 PROCEDURE — 3008F BODY MASS INDEX DOCD: CPT | Performed by: FAMILY MEDICINE

## 2022-02-17 PROCEDURE — 99214 OFFICE O/P EST MOD 30 MIN: CPT | Performed by: FAMILY MEDICINE

## 2022-02-17 RX ORDER — FAMOTIDINE 20 MG/1
20 TABLET, FILM COATED ORAL 2 TIMES DAILY
Qty: 14 TABLET | Refills: 0 | Status: SHIPPED | OUTPATIENT
Start: 2022-02-17

## 2022-02-17 NOTE — PROGRESS NOTES
Jannette Fam 1990 female MRN: [id number]    [address] [facility] - [address]      ASSESSMENT/PLAN  Jannette Fam is a 27 y o  female presents to the office for    Diagnoses and all orders for this visit:    Bronchitis    Costochondritis    Heart burn  -     famotidine (PEPCID) 20 mg tablet; Take 1 tablet (20 mg total) by mouth 2 (two) times a day    Fluttering heart  -     CBC and differential; Future  -     Comprehensive metabolic panel; Future  -     TSH, 3rd generation with Free T4 reflex; Future       -> at this time patient's bronchitis continues to still be present but however there is significant improvement from previous visits  Would recommend getting the chest x-ray still  Costochondritis of bilateral chest walls  At this time no indication for an EK G  As we are speaking patient does have fluttering of the heart every now and then  States that is very infrequent and has never seen a cardiologist   Will get basic labs just to be sure nothing else is going on  And would like her to keep a log  If it seems that it is more consistent and would like her to come in for an EKG were see the cardiologist   Encouraged to avoid aspirin given her epigastric tenderness  Heartburn started on Pepcid twice a day for total 7 days likely induced from steroids         No future appointments  SUBJECTIVE  CC: Follow-up (Bronchitis)      HPI:  Jannette Fam is a 27 y o  female who presents for an acute appointment  From a bronchitis standpoint the patient states that she has been feeling a lot better she almost canceled this appointment however she felt like her chest continues to be sometimes bothersome  When she touches it when she stretching or when she is exerting herself  She does have an infrequent cough  Has been using the inhaler  Patient states that she has is fluttering heart for several years and has never thought anything of it  Thought she would mention it today  Heartburn started recently with anything she eats has no history of this prior to bronchitis  Review of Systems   Constitutional: Negative for activity change, appetite change, chills, fatigue and fever  HENT: Negative for congestion  Respiratory: Negative for cough, chest tightness and shortness of breath  Cardiovascular: Negative for chest pain and leg swelling  Gastrointestinal: Negative for abdominal distention, abdominal pain, constipation, diarrhea, nausea and vomiting  All other systems reviewed and are negative        Historical Information   The patient history was reviewed as follows:  Past Medical History:   Diagnosis Date    Endometriosis 2011    Gestational diabetes 01/27/2020    during pregnancy         Medications:     Current Outpatient Medications:     albuterol (Proventil HFA) 90 mcg/act inhaler, Inhale 2 puffs every 6 (six) hours as needed for wheezing, Disp: 6 7 g, Rfl: 0    ALPRAZolam (XANAX) 0 25 mg tablet, Take 0 25 mg by mouth as needed, Disp: , Rfl:     amitriptyline (ELAVIL) 25 mg tablet, Take 1 tablet (25 mg total) by mouth daily at bedtime, Disp: 30 tablet, Rfl: 3    escitalopram (LEXAPRO) 20 mg tablet, Take 20 mg by mouth daily, Disp: , Rfl:     fluticasone (FLONASE) 50 mcg/act nasal spray, SPRAY 1 SPRAY INTO EACH NOSTRIL EVERY DAY, Disp: 16 mL, Rfl: 1    Lo Loestrin Fe 1 MG-10 MCG / 10 MCG TABS, Take 1 tablet by mouth daily, Disp: , Rfl:     triamcinolone (KENALOG) 0 1 % cream, Apply topically 2 (two) times a day, Disp: , Rfl:     valACYclovir (VALTREX) 500 mg tablet, Take 500 mg by mouth as needed, Disp: , Rfl:     benzonatate (TESSALON) 200 MG capsule, Take 1 capsule (200 mg total) by mouth 3 (three) times a day as needed for cough (Patient not taking: Reported on 2/17/2022 ), Disp: 20 capsule, Rfl: 0    hydrocortisone 2 5 % ointment, APPLY A THIN LAYER TO THE AFFECTED AREA(S) 2 TIMES A DAY MON FRI, STOP WHEN CLEAR (Patient not taking: Reported on 2/17/2022), Disp: , Rfl:     methylPREDNISolone 4 MG tablet therapy pack, Use as directed on package (Patient not taking: Reported on 2/17/2022 ), Disp: 21 each, Rfl: 0    Allergies   Allergen Reactions    Corylus Hives    Nuts - Food Allergy Facial Swelling and Hives       OBJECTIVE  Vitals:   Vitals:    02/17/22 1037   BP: 110/78   BP Location: Left arm   Patient Position: Sitting   Cuff Size: Standard   Pulse: 102   Resp: 16   Temp: 98 2 °F (36 8 °C)   TempSrc: Temporal   SpO2: 98%   Weight: 58 1 kg (128 lb)   Height: 5' 1" (1 549 m)         Physical Exam  Vitals reviewed  Constitutional:       Appearance: She is well-developed  HENT:      Head: Normocephalic and atraumatic  Eyes:      Conjunctiva/sclera: Conjunctivae normal       Pupils: Pupils are equal, round, and reactive to light  Cardiovascular:      Rate and Rhythm: Normal rate and regular rhythm  Heart sounds: Normal heart sounds  Pulmonary:      Effort: Pulmonary effort is normal  No respiratory distress  Breath sounds: Normal breath sounds  Chest:      Chest wall: Tenderness present  Musculoskeletal:         General: Normal range of motion  Cervical back: Normal range of motion and neck supple  Skin:     General: Skin is warm  Capillary Refill: Capillary refill takes less than 2 seconds  Neurological:      Mental Status: She is alert and oriented to person, place, and time                      Matthieu Sibley MD,   [facility]  2/17/2022

## 2022-03-14 ENCOUNTER — TELEPHONE (OUTPATIENT)
Dept: OTOLARYNGOLOGY | Facility: CLINIC | Age: 32
End: 2022-03-14

## 2022-03-14 DIAGNOSIS — G43.709 CHRONIC MIGRAINE WITHOUT AURA WITHOUT STATUS MIGRAINOSUS, NOT INTRACTABLE: ICD-10-CM

## 2022-03-14 RX ORDER — AMITRIPTYLINE HYDROCHLORIDE 25 MG/1
25 TABLET, FILM COATED ORAL
Qty: 30 TABLET | Refills: 3 | Status: SHIPPED | OUTPATIENT
Start: 2022-03-14 | End: 2022-05-24

## 2022-04-14 ENCOUNTER — APPOINTMENT (EMERGENCY)
Dept: RADIOLOGY | Facility: HOSPITAL | Age: 32
End: 2022-04-14
Payer: COMMERCIAL

## 2022-04-14 ENCOUNTER — HOSPITAL ENCOUNTER (EMERGENCY)
Facility: HOSPITAL | Age: 32
Discharge: HOME | End: 2022-04-14
Attending: EMERGENCY MEDICINE
Payer: COMMERCIAL

## 2022-04-14 ENCOUNTER — OFFICE VISIT (OUTPATIENT)
Dept: FAMILY MEDICINE CLINIC | Facility: CLINIC | Age: 32
End: 2022-04-14
Payer: COMMERCIAL

## 2022-04-14 VITALS
HEART RATE: 92 BPM | DIASTOLIC BLOOD PRESSURE: 76 MMHG | OXYGEN SATURATION: 100 % | RESPIRATION RATE: 13 BRPM | TEMPERATURE: 97.6 F | HEIGHT: 61 IN | BODY MASS INDEX: 23.49 KG/M2 | WEIGHT: 124.4 LBS | SYSTOLIC BLOOD PRESSURE: 127 MMHG

## 2022-04-14 VITALS — OXYGEN SATURATION: 98 % | HEART RATE: 112 BPM | TEMPERATURE: 97.9 F | RESPIRATION RATE: 16 BRPM

## 2022-04-14 DIAGNOSIS — R07.9 CHEST PAIN, UNSPECIFIED TYPE: Primary | ICD-10-CM

## 2022-04-14 LAB
ALBUMIN SERPL BCP-MCNC: 3.8 G/DL (ref 3.5–5)
ALP SERPL-CCNC: 59 U/L (ref 46–116)
ALT SERPL W P-5'-P-CCNC: 18 U/L (ref 12–78)
ANION GAP SERPL CALCULATED.3IONS-SCNC: 12 MMOL/L (ref 4–13)
AST SERPL W P-5'-P-CCNC: 16 U/L (ref 5–45)
BASOPHILS # BLD AUTO: 0.05 THOUSANDS/ΜL (ref 0–0.1)
BASOPHILS NFR BLD AUTO: 1 % (ref 0–1)
BILIRUB SERPL-MCNC: 0.88 MG/DL (ref 0.2–1)
BUN SERPL-MCNC: 12 MG/DL (ref 5–25)
CALCIUM SERPL-MCNC: 8.9 MG/DL (ref 8.3–10.1)
CARDIAC TROPONIN I PNL SERPL HS: <2 NG/L
CHLORIDE SERPL-SCNC: 102 MMOL/L (ref 100–108)
CO2 SERPL-SCNC: 24 MMOL/L (ref 21–32)
CREAT SERPL-MCNC: 0.79 MG/DL (ref 0.6–1.3)
D DIMER PPP FEU-MCNC: <0.27 UG/ML FEU
EOSINOPHIL # BLD AUTO: 0.15 THOUSAND/ΜL (ref 0–0.61)
EOSINOPHIL NFR BLD AUTO: 2 % (ref 0–6)
ERYTHROCYTE [DISTWIDTH] IN BLOOD BY AUTOMATED COUNT: 12.3 % (ref 11.6–15.1)
GFR SERPL CREATININE-BSD FRML MDRD: 99 ML/MIN/1.73SQ M
GLUCOSE SERPL-MCNC: 85 MG/DL (ref 65–140)
HCT VFR BLD AUTO: 44.7 % (ref 34.8–46.1)
HGB BLD-MCNC: 14.3 G/DL (ref 11.5–15.4)
IMM GRANULOCYTES # BLD AUTO: 0.01 THOUSAND/UL (ref 0–0.2)
IMM GRANULOCYTES NFR BLD AUTO: 0 % (ref 0–2)
LIPASE SERPL-CCNC: 80 U/L (ref 73–393)
LYMPHOCYTES # BLD AUTO: 2.36 THOUSANDS/ΜL (ref 0.6–4.47)
LYMPHOCYTES NFR BLD AUTO: 35 % (ref 14–44)
MAGNESIUM SERPL-MCNC: 2.4 MG/DL (ref 1.6–2.6)
MCH RBC QN AUTO: 29.7 PG (ref 26.8–34.3)
MCHC RBC AUTO-ENTMCNC: 32 G/DL (ref 31.4–37.4)
MCV RBC AUTO: 93 FL (ref 82–98)
MONOCYTES # BLD AUTO: 0.34 THOUSAND/ΜL (ref 0.17–1.22)
MONOCYTES NFR BLD AUTO: 5 % (ref 4–12)
NEUTROPHILS # BLD AUTO: 3.87 THOUSANDS/ΜL (ref 1.85–7.62)
NEUTS SEG NFR BLD AUTO: 57 % (ref 43–75)
NRBC BLD AUTO-RTO: 0 /100 WBCS
PLATELET # BLD AUTO: 318 THOUSANDS/UL (ref 149–390)
PMV BLD AUTO: 10.5 FL (ref 8.9–12.7)
POTASSIUM SERPL-SCNC: 3.9 MMOL/L (ref 3.5–5.3)
PROT SERPL-MCNC: 7.7 G/DL (ref 6.4–8.2)
RBC # BLD AUTO: 4.81 MILLION/UL (ref 3.81–5.12)
SODIUM SERPL-SCNC: 138 MMOL/L (ref 136–145)
TSH SERPL DL<=0.05 MIU/L-ACNC: 1.46 UIU/ML (ref 0.45–4.5)
WBC # BLD AUTO: 6.78 THOUSAND/UL (ref 4.31–10.16)

## 2022-04-14 PROCEDURE — 83690 ASSAY OF LIPASE: CPT | Performed by: EMERGENCY MEDICINE

## 2022-04-14 PROCEDURE — 85379 FIBRIN DEGRADATION QUANT: CPT | Performed by: EMERGENCY MEDICINE

## 2022-04-14 PROCEDURE — 83735 ASSAY OF MAGNESIUM: CPT | Performed by: EMERGENCY MEDICINE

## 2022-04-14 PROCEDURE — 99214 OFFICE O/P EST MOD 30 MIN: CPT | Performed by: FAMILY MEDICINE

## 2022-04-14 PROCEDURE — 80053 COMPREHEN METABOLIC PANEL: CPT | Performed by: EMERGENCY MEDICINE

## 2022-04-14 PROCEDURE — 84443 ASSAY THYROID STIM HORMONE: CPT | Performed by: EMERGENCY MEDICINE

## 2022-04-14 PROCEDURE — 93005 ELECTROCARDIOGRAM TRACING: CPT

## 2022-04-14 PROCEDURE — 85025 COMPLETE CBC W/AUTO DIFF WBC: CPT | Performed by: EMERGENCY MEDICINE

## 2022-04-14 PROCEDURE — 71045 X-RAY EXAM CHEST 1 VIEW: CPT

## 2022-04-14 PROCEDURE — 99285 EMERGENCY DEPT VISIT HI MDM: CPT

## 2022-04-14 PROCEDURE — 84484 ASSAY OF TROPONIN QUANT: CPT | Performed by: EMERGENCY MEDICINE

## 2022-04-14 PROCEDURE — 36415 COLL VENOUS BLD VENIPUNCTURE: CPT | Performed by: EMERGENCY MEDICINE

## 2022-04-14 PROCEDURE — 96360 HYDRATION IV INFUSION INIT: CPT

## 2022-04-14 PROCEDURE — 1036F TOBACCO NON-USER: CPT | Performed by: FAMILY MEDICINE

## 2022-04-14 PROCEDURE — 99284 EMERGENCY DEPT VISIT MOD MDM: CPT | Performed by: EMERGENCY MEDICINE

## 2022-04-14 RX ADMIN — SODIUM CHLORIDE 1000 ML: 0.9 INJECTION, SOLUTION INTRAVENOUS at 15:59

## 2022-04-14 NOTE — Clinical Note
Chandu Constantinokathryn was seen and treated in our emergency department on 4/14/2022  Diagnosis:     Arabella Calzada  may return to work on return date  She may return on this date: 04/16/2022         If you have any questions or concerns, please don't hesitate to call        Waldemar Jessica DO    ______________________________           _______________          _______________  Hospital Representative                              Date                                Time

## 2022-04-14 NOTE — PROGRESS NOTES
Neptali Morgan 1990 female MRN: [id number]    [address] [facility] - [address]      ASSESSMENT/PLAN  Neptali Morgan is a 27 y o  female presents to the office for    Diagnoses and all orders for this visit:    Chest pain, unspecified type  -     Transfer to other facility       Reason bronchitis infection with steroid usage  Concerning for pulmonary embolism  EKG currently normal   Recommend blood work and pulmonary embolism rule out  If negative workup then I can say that this is anxiety however no improvement with Xanax which is rare         ED  No future appointments  SUBJECTIVE  CC: Chest Pain      HPI:  Neptali Morgan is a 27 y o  female who presents for an acute appointment  Patient states that she just recently started having chest pain yesterday  She states that it lasted all day and has been quite persistent and now radiating into axilla  She states that she took a baby aspirin and the Xanax and had no improvement yesterday  Patient states that she just recovered from bronchitis  Took a COVID test which was negative  Review of Systems   Constitutional: Negative for activity change, appetite change, chills, fatigue and fever  HENT: Negative for congestion  Respiratory: Negative for cough, chest tightness and shortness of breath  Cardiovascular: Positive for chest pain  Negative for leg swelling  Gastrointestinal: Negative for abdominal distention, abdominal pain, constipation, diarrhea, nausea and vomiting  All other systems reviewed and are negative        Historical Information   The patient history was reviewed as follows:  Past Medical History:   Diagnosis Date    Endometriosis 2011    Gestational diabetes 01/27/2020    during pregnancy         Medications:     Current Outpatient Medications:     albuterol (Proventil HFA) 90 mcg/act inhaler, Inhale 2 puffs every 6 (six) hours as needed for wheezing, Disp: 6 7 g, Rfl: 0    ALPRAZolam (XANAX) 0 25 mg tablet, Take 0 25 mg by mouth as needed, Disp: , Rfl:     amitriptyline (ELAVIL) 25 mg tablet, Take 1 tablet (25 mg total) by mouth daily at bedtime, Disp: 30 tablet, Rfl: 3    escitalopram (LEXAPRO) 20 mg tablet, Take 20 mg by mouth daily, Disp: , Rfl:     famotidine (PEPCID) 20 mg tablet, Take 1 tablet (20 mg total) by mouth 2 (two) times a day, Disp: 14 tablet, Rfl: 0    fluticasone (FLONASE) 50 mcg/act nasal spray, SPRAY 1 SPRAY INTO EACH NOSTRIL EVERY DAY, Disp: 16 mL, Rfl: 1    hydrocortisone 2 5 % ointment, APPLY A THIN LAYER TO THE AFFECTED AREA(S) 2 TIMES A DAY MON FRI, STOP WHEN CLEAR (Patient not taking: Reported on 2/17/2022), Disp: , Rfl:     Lo Loestrin Fe 1 MG-10 MCG / 10 MCG TABS, Take 1 tablet by mouth daily, Disp: , Rfl:     triamcinolone (KENALOG) 0 1 % cream, Apply topically 2 (two) times a day, Disp: , Rfl:     valACYclovir (VALTREX) 500 mg tablet, Take 500 mg by mouth as needed, Disp: , Rfl:     Allergies   Allergen Reactions    Corylus Hives    Nuts - Food Allergy Facial Swelling and Hives       OBJECTIVE  Vitals:   Vitals:    04/14/22 1442   Pulse: (!) 112   Resp: 16   Temp: 97 9 °F (36 6 °C)   SpO2: 98%         Physical Exam  Vitals reviewed  Constitutional:       Appearance: She is well-developed  HENT:      Head: Normocephalic and atraumatic  Eyes:      Conjunctiva/sclera: Conjunctivae normal       Pupils: Pupils are equal, round, and reactive to light  Cardiovascular:      Rate and Rhythm: Regular rhythm  Tachycardia present  Heart sounds: Normal heart sounds  Pulmonary:      Effort: Pulmonary effort is normal  No respiratory distress  Breath sounds: Normal breath sounds  Musculoskeletal:         General: Normal range of motion  Cervical back: Normal range of motion and neck supple  Skin:     General: Skin is warm  Capillary Refill: Capillary refill takes less than 2 seconds     Neurological:      Mental Status: She is alert and oriented to person, place, and time  Psychiatric:         Mood and Affect: Mood normal          Behavior: Behavior normal          Thought Content:  Thought content normal          Judgment: Judgment normal                     Micheal Goltz, MD,   [facility]  4/14/2022

## 2022-04-14 NOTE — ED PROVIDER NOTES
History  Chief Complaint   Patient presents with    Chest Pain     pressure like chest pain starting 4/13 1100 accompanied by lightheadedness  Patient took aspirin  Does report hx of anxiety, but had no relief when she took xanax last night      79-year-old female presents the ED with sharp pressure-like chest pain left side of her chest   States she has some lightheadedness with this she does have history of anxiety which she takes medication for intake and she took Xanax last night with no relief  She states the pain has been there constantly since yesterday afternoon  No fevers chills nausea vomiting or diarrhea  No other complaints  History provided by:  Patient   used: No        Prior to Admission Medications   Prescriptions Last Dose Informant Patient Reported? Taking?    ALPRAZolam (XANAX) 0 25 mg tablet   Yes No   Sig: Take 0 25 mg by mouth as needed   Lo Loestrin Fe 1 MG-10 MCG / 10 MCG TABS   Yes No   Sig: Take 1 tablet by mouth daily   albuterol (Proventil HFA) 90 mcg/act inhaler   No No   Sig: Inhale 2 puffs every 6 (six) hours as needed for wheezing   amitriptyline (ELAVIL) 25 mg tablet   No No   Sig: Take 1 tablet (25 mg total) by mouth daily at bedtime   escitalopram (LEXAPRO) 20 mg tablet   Yes No   Sig: Take 20 mg by mouth daily   famotidine (PEPCID) 20 mg tablet   No No   Sig: Take 1 tablet (20 mg total) by mouth 2 (two) times a day   fluticasone (FLONASE) 50 mcg/act nasal spray   No No   Sig: SPRAY 1 SPRAY INTO EACH NOSTRIL EVERY DAY   hydrocortisone 2 5 % ointment   Yes No   Sig: APPLY A THIN LAYER TO THE AFFECTED AREA(S) 2 TIMES A DAY MON FRI, STOP WHEN CLEAR   Patient not taking: Reported on 2/17/2022   triamcinolone (KENALOG) 0 1 % cream   Yes No   Sig: Apply topically 2 (two) times a day   valACYclovir (VALTREX) 500 mg tablet   Yes No   Sig: Take 500 mg by mouth as needed      Facility-Administered Medications: None       Past Medical History:   Diagnosis Date    Endometriosis 2011    Gestational diabetes 01/27/2020    during pregnancy       Past Surgical History:   Procedure Laterality Date    SEPTOPLASTY  2007    Limited FESS       History reviewed  No pertinent family history  I have reviewed and agree with the history as documented  E-Cigarette/Vaping    E-Cigarette Use Never User      E-Cigarette/Vaping Substances    Nicotine No     THC No     CBD No     Flavoring No     Other No     Unknown No      Social History     Tobacco Use    Smoking status: Never Smoker    Smokeless tobacco: Never Used   Vaping Use    Vaping Use: Never used   Substance Use Topics    Alcohol use: Yes    Drug use: Never       Review of Systems   Constitutional: Negative for activity change, chills, diaphoresis and fever  HENT: Negative for congestion, ear pain, nosebleeds, sore throat, trouble swallowing and voice change  Eyes: Negative for pain, discharge and redness  Respiratory: Negative for apnea, cough, choking, shortness of breath, wheezing and stridor  Cardiovascular: Positive for chest pain  Negative for palpitations  Gastrointestinal: Negative for abdominal distention, abdominal pain, constipation, diarrhea, nausea and vomiting  Endocrine: Negative for polydipsia  Genitourinary: Negative for difficulty urinating, dysuria, flank pain, frequency, hematuria and urgency  Musculoskeletal: Negative for back pain, gait problem, joint swelling, myalgias, neck pain and neck stiffness  Skin: Negative for pallor and rash  Neurological: Negative for dizziness, tremors, syncope, speech difficulty, weakness, numbness and headaches  Hematological: Negative for adenopathy  Psychiatric/Behavioral: Negative for confusion, hallucinations, self-injury and suicidal ideas  The patient is not nervous/anxious  Physical Exam  Physical Exam  Vitals and nursing note reviewed  Constitutional:       General: She is not in acute distress       Appearance: She is well-developed  She is not diaphoretic  HENT:      Head: Normocephalic and atraumatic  Right Ear: External ear normal       Left Ear: External ear normal       Nose: Nose normal    Eyes:      Conjunctiva/sclera: Conjunctivae normal       Pupils: Pupils are equal, round, and reactive to light  Cardiovascular:      Rate and Rhythm: Normal rate and regular rhythm  Heart sounds: Normal heart sounds  Pulmonary:      Effort: Pulmonary effort is normal       Breath sounds: Normal breath sounds  Abdominal:      General: Bowel sounds are normal       Palpations: Abdomen is soft  Musculoskeletal:         General: Normal range of motion  Cervical back: Normal range of motion and neck supple  Skin:     General: Skin is warm and dry  Neurological:      Mental Status: She is alert and oriented to person, place, and time  Deep Tendon Reflexes: Reflexes are normal and symmetric  Vital Signs  ED Triage Vitals [04/14/22 1530]   Temperature Pulse Respirations Blood Pressure SpO2   97 6 °F (36 4 °C) 98 16 142/83 99 %      Temp Source Heart Rate Source Patient Position - Orthostatic VS BP Location FiO2 (%)   Tympanic Monitor Lying Right arm --      Pain Score       5           Vitals:    04/14/22 1530 04/14/22 1600   BP: 142/83 127/76   Pulse: 98 92   Patient Position - Orthostatic VS: Lying          Visual Acuity      ED Medications  Medications   sodium chloride 0 9 % bolus 1,000 mL (1,000 mL Intravenous New Bag 4/14/22 5039)       Diagnostic Studies  Results Reviewed     Procedure Component Value Units Date/Time    TSH [[id number]]  (Normal) Collected: 04/14/22 1558    Lab Status: Final result Specimen: Blood from Arm, Left Updated: 04/14/22 1636     TSH 3RD GENERATON 1 460 uIU/mL     Narrative:      Patients undergoing fluorescein dye angiography may retain small amounts of fluorescein in the body for 48-72 hours post procedure   Samples containing fluorescein can produce falsely depressed TSH values  If the patient had this procedure,a specimen should be resubmitted post fluorescein clearance        Lipase [[id number]]  (Normal) Collected: 04/14/22 1558    Lab Status: Final result Specimen: Blood from Arm, Left Updated: 04/14/22 1636     Lipase 80 u/L     Magnesium [[id number]]  (Normal) Collected: 04/14/22 1558    Lab Status: Final result Specimen: Blood from Arm, Left Updated: 04/14/22 1636     Magnesium 2 4 mg/dL     HS Troponin I 2hr [[id number]]     Lab Status: No result Specimen: Blood     HS Troponin I 4hr [[id number]]     Lab Status: No result Specimen: Blood     HS Troponin 0hr (reflex protocol) [[id number]]  (Normal) Collected: 04/14/22 1558    Lab Status: Final result Specimen: Blood from Arm, Left Updated: 04/14/22 1635     hs TnI 0hr <2 ng/L     Comprehensive metabolic panel [[id number]] Collected: 04/14/22 1558    Lab Status: Final result Specimen: Blood from Arm, Left Updated: 04/14/22 1628     Sodium 138 mmol/L      Potassium 3 9 mmol/L      Chloride 102 mmol/L      CO2 24 mmol/L      ANION GAP 12 mmol/L      BUN 12 mg/dL      Creatinine 0 79 mg/dL      Glucose 85 mg/dL      Calcium 8 9 mg/dL      AST 16 U/L      ALT 18 U/L      Alkaline Phosphatase 59 U/L      Total Protein 7 7 g/dL      Albumin 3 8 g/dL      Total Bilirubin 0 88 mg/dL      eGFR 99 ml/min/1 73sq m     Narrative:      Megaflavio guidelines for Chronic Kidney Disease (CKD):     Stage 1 with normal or high GFR (GFR > 90 mL/min/1 73 square meters)    Stage 2 Mild CKD (GFR = 60-89 mL/min/1 73 square meters)    Stage 3A Moderate CKD (GFR = 45-59 mL/min/1 73 square meters)    Stage 3B Moderate CKD (GFR = 30-44 mL/min/1 73 square meters)    Stage 4 Severe CKD (GFR = 15-29 mL/min/1 73 square meters)    Stage 5 End Stage CKD (GFR <15 mL/min/1 73 square meters)  Note: GFR calculation is accurate only with a steady state creatinine    D-Dimer [[id number]]  (Normal) Collected: 04/14/22 1558    Lab Status: Final result Specimen: Blood from Arm, Left Updated: 04/14/22 1625     D-Dimer, Quant <0 27 ug/ml FEU     CBC and differential [[id number]] Collected: 04/14/22 1558    Lab Status: Final result Specimen: Blood from Arm, Left Updated: 04/14/22 1605     WBC 6 78 Thousand/uL      RBC 4 81 Million/uL      Hemoglobin 14 3 g/dL      Hematocrit 44 7 %      MCV 93 fL      MCH 29 7 pg      MCHC 32 0 g/dL      RDW 12 3 %      MPV 10 5 fL      Platelets 358 Thousands/uL      nRBC 0 /100 WBCs      Neutrophils Relative 57 %      Immat GRANS % 0 %      Lymphocytes Relative 35 %      Monocytes Relative 5 %      Eosinophils Relative 2 %      Basophils Relative 1 %      Neutrophils Absolute 3 87 Thousands/µL      Immature Grans Absolute 0 01 Thousand/uL      Lymphocytes Absolute 2 36 Thousands/µL      Monocytes Absolute 0 34 Thousand/µL      Eosinophils Absolute 0 15 Thousand/µL      Basophils Absolute 0 05 Thousands/µL                  XR chest 1 view portable    (Results Pending)              Procedures  Procedures         ED Course                                             MDM  Number of Diagnoses or Management Options  Chest pain, unspecified type  Diagnosis management comments: Patient states her chest pain is sharp in nature constant since yesterday  1st troponin was negative patient's heart rate was in the 90s O2 sat was 99 and 100% on 2 separate checks  Respiratory rate was 13 and 16  Patient's D-dimer was negative at 0 27  No history of blood clots in her her family  At this point low risk for PE    Patient wishes to go home        Disposition  Final diagnoses:   Chest pain, unspecified type     Time reflects when diagnosis was documented in both MDM as applicable and the Disposition within this note     Time User Action Codes Description Comment    4/14/2022  4:46 PM Camille Blanco Add [R07 9] Chest pain, unspecified type       ED Disposition     ED Disposition Condition Date/Time Comment    Discharge Stable Thu Apr 14, 2022  4:46 PM Neisha Watson discharge to home/self care  Follow-up Information     Follow up With Specialties Details Why Contact Info    Katherine Oneill MD Family Medicine Schedule an appointment as soon as possible for a visit  As needed [address] [facility]  [phone]            Patient's Medications   Discharge Prescriptions    No medications on file       No discharge procedures on file      PDMP Review     None          ED Provider  Electronically Signed by           Anayeli Choe DO  04/14/22 3957

## 2022-04-15 LAB
ATRIAL RATE: 106 BPM
ATRIAL RATE: 80 BPM
P AXIS: 49 DEGREES
P AXIS: 58 DEGREES
PR INTERVAL: 122 MS
PR INTERVAL: 150 MS
QRS AXIS: 47 DEGREES
QRS AXIS: 52 DEGREES
QRSD INTERVAL: 76 MS
QRSD INTERVAL: 78 MS
QT INTERVAL: 346 MS
QT INTERVAL: 384 MS
QTC INTERVAL: 442 MS
QTC INTERVAL: 459 MS
T WAVE AXIS: 30 DEGREES
T WAVE AXIS: 42 DEGREES
VENTRICULAR RATE: 106 BPM
VENTRICULAR RATE: 80 BPM

## 2022-04-15 PROCEDURE — 93010 ELECTROCARDIOGRAM REPORT: CPT | Performed by: INTERNAL MEDICINE

## 2022-05-13 ENCOUNTER — TELEMEDICINE (OUTPATIENT)
Dept: FAMILY MEDICINE CLINIC | Facility: CLINIC | Age: 32
End: 2022-05-13
Payer: COMMERCIAL

## 2022-05-13 DIAGNOSIS — J01.90 ACUTE SINUSITIS, RECURRENCE NOT SPECIFIED, UNSPECIFIED LOCATION: Primary | ICD-10-CM

## 2022-05-13 PROCEDURE — 99213 OFFICE O/P EST LOW 20 MIN: CPT | Performed by: FAMILY MEDICINE

## 2022-05-13 RX ORDER — AMOXICILLIN AND CLAVULANATE POTASSIUM 875; 125 MG/1; MG/1
1 TABLET, FILM COATED ORAL EVERY 12 HOURS SCHEDULED
Qty: 14 TABLET | Refills: 0 | Status: SHIPPED | OUTPATIENT
Start: 2022-05-13 | End: 2022-05-20

## 2022-05-13 NOTE — PROGRESS NOTES
Virtual Regular Visit    Verification of patient location:    Patient is located in the following state in which I hold an active license [place]      Assessment/Plan:    Problem List Items Addressed This Visit    None     Visit Diagnoses     Acute sinusitis, recurrence not specified, unspecified location    -  Primary    Relevant Medications    amoxicillin-clavulanate (AUGMENTIN) 875-125 mg per tablet      rto prn          Reason for visit is   Chief Complaint   Patient presents with    Sinusitis        Encounter provider Kb Farrell MD    Provider located at [address] [place] [id number]      Recent Visits  No visits were found meeting these conditions  Showing recent visits within past 7 days and meeting all other requirements  Today's Visits  Date Type Provider Dept   05/13/22 Telemedicine Kb Farrell  [facility] today's visits and meeting all other requirements  Future Appointments  No visits were found meeting these conditions  Showing future appointments within next 150 days and meeting all other requirements       The patient was identified by name and date of birth  Arnoldo Kaminski was informed that this is a telemedicine visit and that the visit is being conducted through [address] [place] [address] Now and patient was informed that this is a secure, HIPAA-compliant platform  She agrees to proceed     My office door was closed  No one else was in the room  She acknowledged consent and understanding of privacy and security of the video platform  The patient has agreed to participate and understands they can discontinue the visit at any time  Patient is aware this is a billable service  Bipin Guzmán is a 27 y o  female    Sinus Pain  Patient complains of congestion, cough, facial pain, headaches, nasal congestion, post nasal drip, sinus pressure and sore throat  Onset of symptoms was 2 weeks ago  Symptoms have been gradually worsening since that time  She is drinking plenty of fluids  Past history is significant for nothing  Patient is non-smoker              Past Medical History:   Diagnosis Date    Endometriosis 2011    Gestational diabetes 01/27/2020    during pregnancy       Past Surgical History:   Procedure Laterality Date    SEPTOPLASTY  2007    Limited FESS       Current Outpatient Medications   Medication Sig Dispense Refill    albuterol (Proventil HFA) 90 mcg/act inhaler Inhale 2 puffs every 6 (six) hours as needed for wheezing 6 7 g 0    ALPRAZolam (XANAX) 0 25 mg tablet Take 0 25 mg by mouth as needed      amitriptyline (ELAVIL) 25 mg tablet Take 1 tablet (25 mg total) by mouth daily at bedtime 30 tablet 3    escitalopram (LEXAPRO) 20 mg tablet Take 20 mg by mouth daily      famotidine (PEPCID) 20 mg tablet Take 1 tablet (20 mg total) by mouth 2 (two) times a day 14 tablet 0    fluticasone (FLONASE) 50 mcg/act nasal spray SPRAY 1 SPRAY INTO EACH NOSTRIL EVERY DAY 16 mL 1    hydrocortisone 2 5 % ointment APPLY A THIN LAYER TO THE AFFECTED AREA(S) 2 TIMES A DAY MON FRI, STOP WHEN CLEAR (Patient not taking: Reported on 2/17/2022)      Lo Loestrin Fe 1 MG-10 MCG / 10 MCG TABS Take 1 tablet by mouth daily      triamcinolone (KENALOG) 0 1 % cream Apply topically 2 (two) times a day      valACYclovir (VALTREX) 500 mg tablet Take 500 mg by mouth as needed       No current facility-administered medications for this visit  Allergies   Allergen Reactions    Corylus Hives    Nuts - Food Allergy Facial Swelling and Hives       Review of Systems   Constitutional: Negative for appetite change, chills, fatigue and fever  HENT: Positive for congestion, postnasal drip, rhinorrhea, sinus pressure, sinus pain and sore throat  Respiratory: Positive for cough  Negative for chest tightness, shortness of breath and wheezing  Cardiovascular: Negative  Gastrointestinal: Negative  Video Exam    There were no vitals filed for this visit  Physical Exam  Constitutional:       Appearance: She is not ill-appearing  Pulmonary:      Effort: Pulmonary effort is normal  No respiratory distress  Neurological:      Mental Status: She is alert and oriented to person, place, and time  Psychiatric:         Mood and Affect: Mood normal           I spent 10 minutes directly with the patient during this visit    [address] [place] [address] verbally agrees to participate in [employer]  Pt is aware that [employer] could be limited without vital signs or the ability to perform a full hands-on physical Melgar Celia understands she or the provider may request at any time to terminate the video visit and request the patient to seek care or treatment in person

## 2022-05-20 ENCOUNTER — OFFICE VISIT (OUTPATIENT)
Dept: FAMILY MEDICINE CLINIC | Facility: CLINIC | Age: 32
End: 2022-05-20
Payer: COMMERCIAL

## 2022-05-20 VITALS
BODY MASS INDEX: 23.41 KG/M2 | HEIGHT: 61 IN | WEIGHT: 124 LBS | TEMPERATURE: 97.4 F | HEART RATE: 88 BPM | DIASTOLIC BLOOD PRESSURE: 70 MMHG | RESPIRATION RATE: 16 BRPM | SYSTOLIC BLOOD PRESSURE: 120 MMHG

## 2022-05-20 DIAGNOSIS — J32.9 SINUSITIS, UNSPECIFIED CHRONICITY, UNSPECIFIED LOCATION: ICD-10-CM

## 2022-05-20 DIAGNOSIS — N89.8 VAGINA ITCHING: Primary | ICD-10-CM

## 2022-05-20 PROCEDURE — 99213 OFFICE O/P EST LOW 20 MIN: CPT | Performed by: FAMILY MEDICINE

## 2022-05-20 RX ORDER — FLUCONAZOLE 150 MG/1
150 TABLET ORAL
Qty: 2 TABLET | Refills: 0 | Status: SHIPPED | OUTPATIENT
Start: 2022-05-20 | End: 2022-05-24

## 2022-05-20 NOTE — PROGRESS NOTES
Chief Complaint   Patient presents with    Sinus Problem    Vaginitis        Patient ID: Mason Mercado is a 27 y o  female  Sinusitis  This is a new problem  The current episode started 1 to 4 weeks ago  The problem has been rapidly improving since onset  There has been no fever  She is experiencing no pain  Pertinent negatives include no chills, congestion, coughing, diaphoresis, ear pain, headaches, hoarse voice, neck pain, shortness of breath, sinus pressure, sneezing, sore throat or swollen glands  Treatments tried: Augmentin x 7 days  The treatment provided significant relief  The following portions of the patient's history were reviewed and updated as appropriate: allergies, current medications, past family history, past medical history, past social history, past surgical history and problem list     Review of Systems   Constitutional: Negative for chills and diaphoresis  HENT: Negative for congestion, ear pain, hoarse voice, sinus pressure, sneezing and sore throat  Respiratory: Negative for cough and shortness of breath  Genitourinary: Positive for vaginal discharge (white with itchiness )  Musculoskeletal: Negative for neck pain  Neurological: Negative for headaches         Current Outpatient Medications   Medication Sig Dispense Refill    albuterol (Proventil HFA) 90 mcg/act inhaler Inhale 2 puffs every 6 (six) hours as needed for wheezing 6 7 g 0    ALPRAZolam (XANAX) 0 25 mg tablet Take 0 25 mg by mouth as needed      amitriptyline (ELAVIL) 25 mg tablet Take 1 tablet (25 mg total) by mouth daily at bedtime 30 tablet 3    amoxicillin-clavulanate (AUGMENTIN) 875-125 mg per tablet Take 1 tablet by mouth every 12 (twelve) hours for 7 days 14 tablet 0    escitalopram (LEXAPRO) 20 mg tablet Take 20 mg by mouth daily      famotidine (PEPCID) 20 mg tablet Take 1 tablet (20 mg total) by mouth 2 (two) times a day 14 tablet 0    fluticasone (FLONASE) 50 mcg/act nasal spray SPRAY 1 SPRAY INTO EACH NOSTRIL EVERY DAY 16 mL 1    Lo Loestrin Fe 1 MG-10 MCG / 10 MCG TABS Take 1 tablet by mouth daily      triamcinolone (KENALOG) 0 1 % cream Apply topically 2 (two) times a day      valACYclovir (VALTREX) 500 mg tablet Take 500 mg by mouth as needed      hydrocortisone 2 5 % ointment APPLY A THIN LAYER TO THE AFFECTED AREA(S) 2 TIMES A DAY MON FRI, STOP WHEN CLEAR       No current facility-administered medications for this visit  Objective:    /70   Pulse 88   Temp (!) 97 4 °F (36 3 °C)   Resp 16   Ht 5' 1" (1 549 m)   Wt 56 2 kg (124 lb)   BMI 23 43 kg/m²        Physical Exam  Constitutional:       Appearance: She is not ill-appearing  HENT:      Right Ear: Tympanic membrane normal       Left Ear: Tympanic membrane normal       Nose: No congestion or rhinorrhea  Mouth/Throat:      Pharynx: No oropharyngeal exudate or posterior oropharyngeal erythema  Cardiovascular:      Rate and Rhythm: Normal rate  Pulmonary:      Effort: Pulmonary effort is normal  No respiratory distress  Neurological:      Mental Status: She is alert  Assessment/Plan:         Diagnoses and all orders for this visit:    Vagina itching  -     fluconazole (DIFLUCAN) 150 mg tablet;  Take 1 tablet (150 mg total) by mouth every third day for 2 doses    Sinusitis, unspecified chronicity, unspecified location      resolving     rto prn                       Sasha Cardenas MD

## 2022-05-24 ENCOUNTER — TELEPHONE (OUTPATIENT)
Dept: OTOLARYNGOLOGY | Facility: CLINIC | Age: 32
End: 2022-05-24

## 2022-05-24 ENCOUNTER — OFFICE VISIT (OUTPATIENT)
Dept: FAMILY MEDICINE CLINIC | Facility: CLINIC | Age: 32
End: 2022-05-24
Payer: COMMERCIAL

## 2022-05-24 VITALS
DIASTOLIC BLOOD PRESSURE: 70 MMHG | WEIGHT: 124 LBS | HEART RATE: 120 BPM | HEIGHT: 61 IN | BODY MASS INDEX: 23.41 KG/M2 | SYSTOLIC BLOOD PRESSURE: 116 MMHG | TEMPERATURE: 97.3 F | RESPIRATION RATE: 16 BRPM

## 2022-05-24 DIAGNOSIS — R05.9 COUGH: ICD-10-CM

## 2022-05-24 DIAGNOSIS — G43.709 CHRONIC MIGRAINE WITHOUT AURA WITHOUT STATUS MIGRAINOSUS, NOT INTRACTABLE: ICD-10-CM

## 2022-05-24 DIAGNOSIS — R09.81 SINUS CONGESTION: Primary | ICD-10-CM

## 2022-05-24 PROCEDURE — 1036F TOBACCO NON-USER: CPT | Performed by: FAMILY MEDICINE

## 2022-05-24 PROCEDURE — 99213 OFFICE O/P EST LOW 20 MIN: CPT | Performed by: FAMILY MEDICINE

## 2022-05-24 PROCEDURE — 3008F BODY MASS INDEX DOCD: CPT | Performed by: FAMILY MEDICINE

## 2022-05-24 RX ORDER — BENZONATATE 200 MG/1
200 CAPSULE ORAL 3 TIMES DAILY PRN
Qty: 20 CAPSULE | Refills: 0 | Status: SHIPPED | OUTPATIENT
Start: 2022-05-24

## 2022-05-24 RX ORDER — AMITRIPTYLINE HYDROCHLORIDE 25 MG/1
TABLET, FILM COATED ORAL
Qty: 90 TABLET | Refills: 0 | Status: SHIPPED | OUTPATIENT
Start: 2022-05-24 | End: 2022-06-27 | Stop reason: SDUPTHER

## 2022-05-24 RX ORDER — MOXIFLOXACIN HYDROCHLORIDE 400 MG/1
400 TABLET ORAL DAILY
Qty: 7 TABLET | Refills: 0 | Status: SHIPPED | OUTPATIENT
Start: 2022-05-24 | End: 2022-05-31

## 2022-05-24 NOTE — PROGRESS NOTES
Chief Complaint   Patient presents with    Cough    Nasal Congestion    Headache        Patient ID: Carolina Berry is a 27 y o  female  Sinusitis  This is a recurrent problem  The current episode started 1 to 4 weeks ago  The problem has been gradually worsening since onset  There has been no fever  Her pain is at a severity of 5/10  The pain is mild  Associated symptoms include congestion, coughing, ear pain and sinus pressure  Pertinent negatives include no chills, diaphoresis, headaches, hoarse voice, neck pain, shortness of breath, sneezing, sore throat or swollen glands  Past treatments include antibiotics  The treatment provided mild relief  The following portions of the patient's history were reviewed and updated as appropriate: allergies, current medications, past family history, past medical history, past social history, past surgical history and problem list     Review of Systems   Constitutional: Negative for chills and diaphoresis  HENT: Positive for congestion, ear pain and sinus pressure  Negative for hoarse voice, sneezing and sore throat  Respiratory: Positive for cough  Negative for shortness of breath  Musculoskeletal: Negative for neck pain  Neurological: Negative for headaches         Current Outpatient Medications   Medication Sig Dispense Refill    albuterol (Proventil HFA) 90 mcg/act inhaler Inhale 2 puffs every 6 (six) hours as needed for wheezing 6 7 g 0    ALPRAZolam (XANAX) 0 25 mg tablet Take 0 25 mg by mouth as needed      amitriptyline (ELAVIL) 25 mg tablet Take 1 tablet (25 mg total) by mouth daily at bedtime 30 tablet 3    escitalopram (LEXAPRO) 20 mg tablet Take 20 mg by mouth daily      famotidine (PEPCID) 20 mg tablet Take 1 tablet (20 mg total) by mouth 2 (two) times a day 14 tablet 0    fluticasone (FLONASE) 50 mcg/act nasal spray SPRAY 1 SPRAY INTO EACH NOSTRIL EVERY DAY 16 mL 1    hydrocortisone 2 5 % ointment APPLY A THIN LAYER TO THE AFFECTED AREA(S) 2 TIMES A DAY MON FRI, STOP WHEN CLEAR      Lo Loestrin Fe 1 MG-10 MCG / 10 MCG TABS Take 1 tablet by mouth daily      triamcinolone (KENALOG) 0 1 % cream Apply topically 2 (two) times a day      valACYclovir (VALTREX) 500 mg tablet Take 500 mg by mouth as needed      fluconazole (DIFLUCAN) 150 mg tablet Take 1 tablet (150 mg total) by mouth every third day for 2 doses 2 tablet 0     No current facility-administered medications for this visit  Objective:    /70   Pulse (!) 120   Temp (!) 97 3 °F (36 3 °C)   Resp 16   Ht 5' 1" (1 549 m)   Wt 56 2 kg (124 lb)   BMI 23 43 kg/m²        Physical Exam  Constitutional:       General: She is not in acute distress  Appearance: She is not ill-appearing  HENT:      Right Ear: A middle ear effusion is present  Tympanic membrane is not erythematous  Left Ear: A middle ear effusion is present  Tympanic membrane is not erythematous  Nose:      Right Sinus: Maxillary sinus tenderness and frontal sinus tenderness present  Left Sinus: Maxillary sinus tenderness and frontal sinus tenderness present  Mouth/Throat:      Pharynx: Posterior oropharyngeal erythema present  No oropharyngeal exudate  Tonsils: No tonsillar exudate  Cardiovascular:      Rate and Rhythm: Regular rhythm  Tachycardia present  Pulmonary:      Effort: Pulmonary effort is normal  No respiratory distress  Breath sounds: No wheezing, rhonchi or rales  Neurological:      Mental Status: She is alert  Assessment/Plan:         Diagnoses and all orders for this visit:    Sinus congestion  -     moxifloxacin (AVELOX) 400 MG tablet; Take 1 tablet (400 mg total) by mouth in the morning for 7 days  Cough  -     benzonatate (TESSALON) 200 MG capsule; Take 1 capsule (200 mg total) by mouth as needed in the morning and 1 capsule (200 mg total) as needed at noon and 1 capsule (200 mg total) as needed in the evening for cough            rto justina Doyle MD

## 2022-05-24 NOTE — TELEPHONE ENCOUNTER
SAMUEL for [facility] for her to call back regarding the below message  [address] sent to [facility]  Caller: Unspecified (Today, 11:16 AM)  One refill sent as requested  Genesis Thomas last visit was 08/2021  Litzy Seven will need an appt with one of our doctors (or she can see [address] [place] [address] [address] [facility]) for additional refills   Or she can contact her primary doctor or neurologist for them to manage the medication that is used for her migraines     [address]

## 2022-06-27 ENCOUNTER — OFFICE VISIT (OUTPATIENT)
Dept: OTOLARYNGOLOGY | Facility: CLINIC | Age: 32
End: 2022-06-27
Payer: COMMERCIAL

## 2022-06-27 VITALS — HEIGHT: 61 IN | WEIGHT: 122 LBS | TEMPERATURE: 97.2 F | BODY MASS INDEX: 23.03 KG/M2

## 2022-06-27 DIAGNOSIS — G43.709 CHRONIC MIGRAINE WITHOUT AURA WITHOUT STATUS MIGRAINOSUS, NOT INTRACTABLE: Primary | ICD-10-CM

## 2022-06-27 DIAGNOSIS — J34.2 NASAL SEPTAL DEVIATION: ICD-10-CM

## 2022-06-27 DIAGNOSIS — J34.89 NASAL VALVE STENOSIS: ICD-10-CM

## 2022-06-27 PROCEDURE — 3008F BODY MASS INDEX DOCD: CPT | Performed by: STUDENT IN AN ORGANIZED HEALTH CARE EDUCATION/TRAINING PROGRAM

## 2022-06-27 PROCEDURE — 99213 OFFICE O/P EST LOW 20 MIN: CPT | Performed by: STUDENT IN AN ORGANIZED HEALTH CARE EDUCATION/TRAINING PROGRAM

## 2022-06-27 PROCEDURE — 1036F TOBACCO NON-USER: CPT | Performed by: STUDENT IN AN ORGANIZED HEALTH CARE EDUCATION/TRAINING PROGRAM

## 2022-06-27 RX ORDER — AMITRIPTYLINE HYDROCHLORIDE 25 MG/1
25 TABLET, FILM COATED ORAL
Qty: 90 TABLET | Refills: 0 | Status: SHIPPED | OUTPATIENT
Start: 2022-06-27

## 2022-06-27 NOTE — PROGRESS NOTES
Otolaryngology-- Head and Neck Surgery Follow up visit      Follow up:      Here for external nasal deformity  Had 2 -3 nasal surgerires  Also migraine and she is on Amitriptyline  Review of any relevant imaging:      Interval Review of systems: Pertinent review of systems documented in the HPI  Interval Social History:  Social History     Socioeconomic History    Marital status: /Civil Union     Spouse name: Not on file    Number of children: Not on file    Years of education: Not on file    Highest education level: Not on file   Occupational History    Not on file   Tobacco Use    Smoking status: Never Smoker    Smokeless tobacco: Never Used   Vaping Use    Vaping Use: Never used   Substance and Sexual Activity    Alcohol use: Yes    Drug use: Never    Sexual activity: Not on file   Other Topics Concern    Not on file   Social History Narrative    Not on file     Social Determinants of Health     Financial Resource Strain: Not on file   Food Insecurity: Not on file   Transportation Needs: Not on file   Physical Activity: Not on file   Stress: Not on file   Social Connections: Not on file   Intimate Partner Violence: Not on file   Housing Stability: Not on file       Interval Physical Examination:  Temp (!) 97 2 °F (36 2 °C) (Temporal)   Ht 5' 1" (1 549 m)   Wt 55 3 kg (122 lb)   BMI 23 05 kg/m²     Nasal: Notable for a twisted nasal deformity  Procedure:      Assessment:  1  Chronic migraine without aura without status migrainosus, not intractable  amitriptyline (ELAVIL) 25 mg tablet   2  Nasal septal deviation     3  Nasal valve stenosis         Plan:    Amitryptaline refilled      Not interested in nose surgery at this stage

## 2022-09-14 ENCOUNTER — TELEPHONE (OUTPATIENT)
Dept: FAMILY MEDICINE CLINIC | Facility: CLINIC | Age: 32
End: 2022-09-14

## 2022-09-14 ENCOUNTER — OFFICE VISIT (OUTPATIENT)
Dept: FAMILY MEDICINE CLINIC | Facility: CLINIC | Age: 32
End: 2022-09-14
Payer: COMMERCIAL

## 2022-09-14 VITALS
HEIGHT: 61 IN | BODY MASS INDEX: 24.17 KG/M2 | DIASTOLIC BLOOD PRESSURE: 76 MMHG | SYSTOLIC BLOOD PRESSURE: 120 MMHG | RESPIRATION RATE: 16 BRPM | WEIGHT: 128 LBS | OXYGEN SATURATION: 97 % | HEART RATE: 115 BPM | TEMPERATURE: 97 F

## 2022-09-14 DIAGNOSIS — J32.9 SINUSITIS, UNSPECIFIED CHRONICITY, UNSPECIFIED LOCATION: ICD-10-CM

## 2022-09-14 DIAGNOSIS — J32.9 SINUSITIS, UNSPECIFIED CHRONICITY, UNSPECIFIED LOCATION: Primary | ICD-10-CM

## 2022-09-14 DIAGNOSIS — H60.503 ACUTE OTITIS EXTERNA OF BOTH EARS, UNSPECIFIED TYPE: Primary | ICD-10-CM

## 2022-09-14 PROCEDURE — 99213 OFFICE O/P EST LOW 20 MIN: CPT | Performed by: FAMILY MEDICINE

## 2022-09-14 PROCEDURE — 3725F SCREEN DEPRESSION PERFORMED: CPT | Performed by: FAMILY MEDICINE

## 2022-09-14 RX ORDER — FLUCONAZOLE 150 MG/1
150 TABLET ORAL ONCE
Qty: 2 TABLET | Refills: 0 | Status: SHIPPED | OUTPATIENT
Start: 2022-09-14 | End: 2022-09-14

## 2022-09-14 RX ORDER — AMOXICILLIN AND CLAVULANATE POTASSIUM 875; 125 MG/1; MG/1
1 TABLET, FILM COATED ORAL EVERY 12 HOURS SCHEDULED
Qty: 20 TABLET | Refills: 0 | Status: SHIPPED | OUTPATIENT
Start: 2022-09-14 | End: 2022-09-24

## 2022-09-14 RX ORDER — CEFDINIR 300 MG/1
300 CAPSULE ORAL EVERY 12 HOURS SCHEDULED
Qty: 14 CAPSULE | Refills: 0 | Status: SHIPPED | OUTPATIENT
Start: 2022-09-14 | End: 2022-09-21

## 2022-09-14 RX ORDER — DOXYCYCLINE HYCLATE 100 MG/1
100 CAPSULE ORAL EVERY 12 HOURS SCHEDULED
Qty: 14 CAPSULE | Refills: 0 | Status: SHIPPED | OUTPATIENT
Start: 2022-09-14 | End: 2022-09-14

## 2022-09-14 NOTE — PROGRESS NOTES
Yaimlet Lopez 1990 female MRN: [id number]    [facility]      ASSESSMENT/PLAN  Yamilet Lopez is a 27 y o  female presents to the office for    Diagnoses and all orders for this visit:    Acute otitis externa of both ears, unspecified type  -     amoxicillin-clavulanate (Augmentin) 875-125 mg per tablet; Take 1 tablet by mouth every 12 (twelve) hours for 10 days  -     fluconazole (DIFLUCAN) 150 mg tablet; Take 1 tablet (150 mg total) by mouth once for 1 dose    Sinusitis, unspecified chronicity, unspecified location  -     amoxicillin-clavulanate (Augmentin) 875-125 mg per tablet; Take 1 tablet by mouth every 12 (twelve) hours for 10 days  -     fluconazole (DIFLUCAN) 150 mg tablet; Take 1 tablet (150 mg total) by mouth once for 1 dose              No future appointments  SUBJECTIVE  CC: Sinusitis (Congestion, ear aches, mucus, negative COVID )      HPI:  Yamilet Lopez is a 27 y o  female who presents for an acute appointment  Her son was just recently sick  Yesterday night, eyes hurts, ears hurt, mucous got thicker,not bad cough at this time  Pressure of face  Review of Systems   Constitutional: Negative for activity change, appetite change, chills, fatigue and fever  HENT: Positive for congestion, sinus pressure, sinus pain, sore throat and trouble swallowing  Respiratory: Positive for cough (mild)  Negative for chest tightness and shortness of breath  Cardiovascular: Negative for chest pain and leg swelling  Gastrointestinal: Negative for abdominal distention, abdominal pain, constipation, diarrhea, nausea and vomiting  Anal bleeding: off yesterday but didn't eat much  Allergic/Immunologic: Positive for environmental allergies  All other systems reviewed and are negative        Historical Information   The patient history was reviewed as follows:  Past Medical History:   Diagnosis Date    Endometriosis 2011    Gestational diabetes 01/27/2020    during pregnancy         Medications:     Current Outpatient Medications:     albuterol (Proventil HFA) 90 mcg/act inhaler, Inhale 2 puffs every 6 (six) hours as needed for wheezing, Disp: 6 7 g, Rfl: 0    ALPRAZolam (XANAX) 0 25 mg tablet, Take 0 25 mg by mouth as needed, Disp: , Rfl:     amitriptyline (ELAVIL) 25 mg tablet, Take 1 tablet (25 mg total) by mouth daily at bedtime, Disp: 90 tablet, Rfl: 0    amoxicillin-clavulanate (Augmentin) 875-125 mg per tablet, Take 1 tablet by mouth every 12 (twelve) hours for 10 days, Disp: 20 tablet, Rfl: 0    escitalopram (LEXAPRO) 20 mg tablet, Take 20 mg by mouth daily, Disp: , Rfl:     fluticasone (FLONASE) 50 mcg/act nasal spray, SPRAY 1 SPRAY INTO EACH NOSTRIL EVERY DAY, Disp: 16 mL, Rfl: 1    hydrocortisone 2 5 % ointment, APPLY A THIN LAYER TO THE AFFECTED AREA(S) 2 TIMES A DAY MON FRI, STOP WHEN CLEAR, Disp: , Rfl:     Lo Loestrin Fe 1 MG-10 MCG / 10 MCG TABS, Take 1 tablet by mouth daily, Disp: , Rfl:     triamcinolone (KENALOG) 0 1 % cream, Apply topically 2 (two) times a day, Disp: , Rfl:     valACYclovir (VALTREX) 500 mg tablet, Take 500 mg by mouth as needed, Disp: , Rfl:     benzonatate (TESSALON) 200 MG capsule, Take 1 capsule (200 mg total) by mouth as needed in the morning and 1 capsule (200 mg total) as needed at noon and 1 capsule (200 mg total) as needed in the evening for cough   (Patient not taking: No sig reported), Disp: 20 capsule, Rfl: 0    cefdinir (OMNICEF) 300 mg capsule, Take 1 capsule (300 mg total) by mouth every 12 (twelve) hours for 7 days, Disp: 14 capsule, Rfl: 0    famotidine (PEPCID) 20 mg tablet, Take 1 tablet (20 mg total) by mouth 2 (two) times a day (Patient not taking: No sig reported), Disp: 14 tablet, Rfl: 0    Allergies   Allergen Reactions    Corylus Hives    Nuts - Food Allergy Facial Swelling and Hives       OBJECTIVE  Vitals:   Vitals:    09/14/22 1510 BP: 120/76   BP Location: Right arm   Patient Position: Sitting   Cuff Size: Standard   Pulse: (!) 115   Resp: 16   Temp: (!) 97 °F (36 1 °C)   SpO2: 97%   Weight: 58 1 kg (128 lb)   Height: 5' 1" (1 549 m)         Physical Exam  Vitals reviewed  Constitutional:       Appearance: She is well-developed  HENT:      Head: Normocephalic and atraumatic  Right Ear: Ear canal normal  Tympanic membrane is erythematous and bulging  Left Ear: Tympanic membrane is erythematous and bulging  Eyes:      Conjunctiva/sclera: Conjunctivae normal       Pupils: Pupils are equal, round, and reactive to light  Cardiovascular:      Rate and Rhythm: Normal rate and regular rhythm  Heart sounds: Normal heart sounds  Pulmonary:      Effort: Pulmonary effort is normal  No respiratory distress  Breath sounds: Normal breath sounds  Musculoskeletal:         General: Normal range of motion  Cervical back: Normal range of motion and neck supple  Skin:     General: Skin is warm  Capillary Refill: Capillary refill takes less than 2 seconds  Neurological:      Mental Status: She is alert and oriented to person, place, and time                      Cullen Gibson MD,   [facility]  9/16/2022

## 2022-09-15 NOTE — TELEPHONE ENCOUNTER
PLease advise patient I called in cefdinir to replace augmentin  Orginally placed doxy but given her infection I switched to cefdinir      Reason for change Augmentin is on back order

## 2022-09-22 NOTE — TELEPHONE ENCOUNTER
Dr Wendi Fox:    Patient just finished cefdinir and now is noticing a rash all over stomach and back area  Rash is not spreading anywhere else on patient's body  Please c/b to discuss further with her

## 2022-09-22 NOTE — TELEPHONE ENCOUNTER
LVM likely a reaction to cefdinir, recommend steroid pack for the patient for the rash   Overrall how is she feeling

## 2022-09-26 ENCOUNTER — OFFICE VISIT (OUTPATIENT)
Dept: FAMILY MEDICINE CLINIC | Facility: CLINIC | Age: 32
End: 2022-09-26
Payer: COMMERCIAL

## 2022-09-26 VITALS
DIASTOLIC BLOOD PRESSURE: 80 MMHG | OXYGEN SATURATION: 99 % | HEIGHT: 61 IN | HEART RATE: 96 BPM | WEIGHT: 127.25 LBS | BODY MASS INDEX: 24.02 KG/M2 | SYSTOLIC BLOOD PRESSURE: 120 MMHG | TEMPERATURE: 98.7 F | RESPIRATION RATE: 16 BRPM

## 2022-09-26 DIAGNOSIS — J32.9 SINUSITIS, UNSPECIFIED CHRONICITY, UNSPECIFIED LOCATION: Primary | ICD-10-CM

## 2022-09-26 PROCEDURE — 99213 OFFICE O/P EST LOW 20 MIN: CPT | Performed by: FAMILY MEDICINE

## 2022-09-26 RX ORDER — METHYLPREDNISOLONE 4 MG/1
TABLET ORAL
Qty: 21 EACH | Refills: 0 | Status: SHIPPED | OUTPATIENT
Start: 2022-09-26

## 2022-09-26 RX ORDER — AMOXICILLIN AND CLAVULANATE POTASSIUM 875; 125 MG/1; MG/1
1 TABLET, FILM COATED ORAL EVERY 12 HOURS SCHEDULED
Qty: 14 TABLET | Refills: 0 | Status: SHIPPED | OUTPATIENT
Start: 2022-09-26 | End: 2022-10-03

## 2022-09-26 NOTE — PROGRESS NOTES
Cleo Treadwell 1990 female MRN: [id number]    [address] [place] [address]      ASSESSMENT/PLAN  Cleo Treadwell is a 27 y o  female presents to the office for    Diagnoses and all orders for this visit:    Sinusitis, unspecified chronicity, unspecified location  -     amoxicillin-clavulanate (AUGMENTIN) 875-125 mg per tablet; Take 1 tablet by mouth every 12 (twelve) hours for 7 days  -     methylPREDNISolone 4 MG tablet therapy pack; Use as directed on package     Patient has bilateral ear infection has completely resolved at this time but there is a middle ear effusion likely secondary to allergies  Recommend a Medrol pack to be started to help with pressure of her ears  As well as to help with the rash does stays dormant from her allergic reaction to the antibiotic  Her throat seems to continue to be erythematous recommend a short course of Augmentin  If there is no improvement to please contact our office  But recommend staying on allergy medication  Switch to normal saline rather than Flonase           No future appointments  SUBJECTIVE  CC: Sinusitis (Congestion and sore throat )      HPI:  Cleo Treadwell is a 27 y o  female who presents for a follow-up appointment  Unfortunately patient was placed on cefdinir and stated that she had allergic reaction with a rash on both sides of her torso  Patient denied any other rashes  She states that her ears have gotten better but feels that they are still pressure  She states that her throat is hurting her  Unfortunately her son is currently sick also  Denies any fevers chills or body aches  Review of Systems   Constitutional: Negative for activity change, appetite change, chills, fatigue and fever  HENT: Positive for ear pain and sore throat  Negative for congestion  Respiratory: Negative for cough, chest tightness and shortness of breath      Cardiovascular: Negative for chest pain and leg swelling  Gastrointestinal: Negative for abdominal distention, abdominal pain, constipation, diarrhea, nausea and vomiting  All other systems reviewed and are negative  Historical Information   The patient history was reviewed as follows:  Past Medical History:   Diagnosis Date    Endometriosis 2011    Gestational diabetes 01/27/2020    during pregnancy         Medications:     Current Outpatient Medications:     ALPRAZolam (XANAX) 0 25 mg tablet, Take 0 25 mg by mouth as needed, Disp: , Rfl:     amitriptyline (ELAVIL) 25 mg tablet, Take 1 tablet (25 mg total) by mouth daily at bedtime, Disp: 90 tablet, Rfl: 0    amoxicillin-clavulanate (AUGMENTIN) 875-125 mg per tablet, Take 1 tablet by mouth every 12 (twelve) hours for 7 days, Disp: 14 tablet, Rfl: 0    escitalopram (LEXAPRO) 20 mg tablet, Take 20 mg by mouth daily, Disp: , Rfl:     fluticasone (FLONASE) 50 mcg/act nasal spray, SPRAY 1 SPRAY INTO EACH NOSTRIL EVERY DAY, Disp: 16 mL, Rfl: 1    hydrocortisone 2 5 % ointment, APPLY A THIN LAYER TO THE AFFECTED AREA(S) 2 TIMES A DAY MON FRI, STOP WHEN CLEAR, Disp: , Rfl:     Lo Loestrin Fe 1 MG-10 MCG / 10 MCG TABS, Take 1 tablet by mouth daily, Disp: , Rfl:     methylPREDNISolone 4 MG tablet therapy pack, Use as directed on package, Disp: 21 each, Rfl: 0    triamcinolone (KENALOG) 0 1 % cream, Apply topically 2 (two) times a day, Disp: , Rfl:     valACYclovir (VALTREX) 500 mg tablet, Take 500 mg by mouth as needed, Disp: , Rfl:     Allergies   Allergen Reactions    Cefdinir Hives    Corylus Hives    Nuts - Food Allergy Facial Swelling and Hives       OBJECTIVE  Vitals:   Vitals:    09/26/22 0852   BP: 120/80   BP Location: Left arm   Patient Position: Sitting   Cuff Size: Standard   Pulse: 96   Resp: 16   Temp: 98 7 °F (37 1 °C)   SpO2: 99%   Weight: 57 7 kg (127 lb 4 oz)   Height: 5' 1" (1 549 m)         Physical Exam  Vitals reviewed     Constitutional:       Appearance: She is well-developed  HENT:      Head: Normocephalic and atraumatic  Right Ear: A middle ear effusion is present  Left Ear: A middle ear effusion is present  Mouth/Throat:      Pharynx: Posterior oropharyngeal erythema present  Eyes:      Conjunctiva/sclera: Conjunctivae normal       Pupils: Pupils are equal, round, and reactive to light  Cardiovascular:      Rate and Rhythm: Normal rate and regular rhythm  Heart sounds: Normal heart sounds  Pulmonary:      Effort: Pulmonary effort is normal  No respiratory distress  Breath sounds: Normal breath sounds  Musculoskeletal:         General: Normal range of motion  Cervical back: Normal range of motion and neck supple  Skin:     General: Skin is warm  Capillary Refill: Capillary refill takes less than 2 seconds  Neurological:      Mental Status: She is alert and oriented to person, place, and time                      Adilene Sanders MD,   [facility]  9/26/2022

## 2022-10-10 NOTE — PATIENT INSTRUCTIONS
Acute Bronchitis   WHAT YOU NEED TO KNOW:   Acute bronchitis is swelling and irritation in your lungs  It is usually caused by a virus and most often happens in the winter  Bronchitis may also be caused by bacteria or by a chemical irritant, such as smoke  DISCHARGE INSTRUCTIONS:   Return to the emergency department if:   · You cough up blood  · Your lips or fingernails turn blue  · You feel like you are not getting enough air when you breathe  Call your doctor if:   · Your symptoms do not go away or get worse, even after treatment  · Your cough does not get better within 4 weeks  · You have questions or concerns about your condition or care  Medicines: You may  need any of the following:  · Cough suppressants  decrease your urge to cough  · Decongestants  help loosen mucus in your lungs and make it easier to cough up  This can help you breathe easier  · Inhalers  may be given  Your healthcare provider may give you one or more inhalers to help you breathe easier and cough less  An inhaler gives your medicine to open your airways  Ask your healthcare provider to show you how to use your inhaler correctly  · Antibiotics  may be given for up to 5 days if your bronchitis is caused by bacteria  · Acetaminophen  decreases pain and fever  It is available without a doctor's order  Ask how much to take and how often to take it  Follow directions  Read the labels of all other medicines you are using to see if they also contain acetaminophen, or ask your doctor or pharmacist  Acetaminophen can cause liver damage if not taken correctly  Do not use more than 4 grams (4,000 milligrams) total of acetaminophen in one day  · NSAIDs  help decrease swelling and pain or fever  This medicine is available with or without a doctor's order  NSAIDs can cause stomach bleeding or kidney problems in certain people   If you take blood thinner medicine, always ask your healthcare provider if NSAIDs are safe for you  Always read the medicine label and follow directions  · Take your medicine as directed  Contact your healthcare provider if you think your medicine is not helping or if you have side effects  Tell him of her if you are allergic to any medicine  Keep a list of the medicines, vitamins, and herbs you take  Include the amounts, and when and why you take them  Bring the list or the pill bottles to follow-up visits  Carry your medicine list with you in case of an emergency  Self-care:   · Drink liquids as directed  You may need to drink more liquids than usual to stay hydrated  Ask how much liquid to drink each day and which liquids are best for you  · Use a cool mist humidifier  to increase air moisture in your home  This may make it easier for you to breathe and help decrease your cough  · Get more rest   Rest helps your body to heal  Slowly start to do more each day  Rest when you feel it is needed  · Avoid irritants in the air  Avoid chemicals, fumes, and dust  Wear a face mask if you must work around dust or fumes  Stay inside on days when air pollution levels are high  If you have allergies, stay inside when pollen counts are high  Do not use aerosol products, such as spray-on deodorant, bug spray, and hair spray  · Do not smoke or be around others who are smoking  Nicotine and other chemicals in cigarettes and cigars can cause lung damage  Ask your healthcare provider for information if you currently smoke and need help to quit  E-cigarettes or smokeless tobacco still contain nicotine  Talk to your healthcare provider before you use these products  Prevent acute bronchitis:       · Ask about vaccines you may need  Get a flu vaccine each year as soon as recommended, usually in September or October  Ask your healthcare provider if you should also get a pneumonia or COVID-19 vaccine   Your healthcare provider can tell you if you should also get other vaccines, and when to get them     · Prevent the spread of germs  You can decrease your risk for acute bronchitis and other illnesses by doing the following:     ? Wash your hands often with soap and water  Carry germ-killing hand lotion or gel with you  You can use the lotion or gel to clean your hands when soap and water are not available  ? Do not touch your eyes, nose, or mouth unless you have washed your hands first     ? Always cover your mouth when you cough to prevent the spread of germs  It is best to cough into a tissue or your shirt sleeve instead of into your hand  Ask those around you to cover their mouths when they cough  ? Try to avoid people who have a cold or the flu  If you are sick, stay away from others as much as possible  Follow up with your doctor as directed:  Write down questions you have so you will remember to ask them during your follow-up visits  © Copyright [employer] 2021 Information is for End User's use only and may not be sold, redistributed or otherwise used for commercial purposes  All illustrations and images included in CareNotes® are the copyrighted property of A D A M , Inc  or [employer]  The above information is an  only  It is not intended as medical advice for individual conditions or treatments  Talk to your doctor, nurse or pharmacist before following any medical regimen to see if it is safe and effective for you  Albendazole Counseling:  I discussed with the patient the risks of albendazole including but not limited to cytopenia, kidney damage, nausea/vomiting and severe allergy.  The patient understands that this medication is being used in an off-label manner.

## 2022-10-14 ENCOUNTER — TELEMEDICINE (OUTPATIENT)
Dept: FAMILY MEDICINE CLINIC | Facility: CLINIC | Age: 32
End: 2022-10-14
Payer: COMMERCIAL

## 2022-10-14 DIAGNOSIS — F41.9 ANXIETY: ICD-10-CM

## 2022-10-14 DIAGNOSIS — F41.0 PANIC DISORDER: Primary | ICD-10-CM

## 2022-10-14 PROCEDURE — 99214 OFFICE O/P EST MOD 30 MIN: CPT | Performed by: FAMILY MEDICINE

## 2022-10-14 RX ORDER — HYDROXYZINE HYDROCHLORIDE 25 MG/1
TABLET, FILM COATED ORAL
Qty: 30 TABLET | Refills: 0 | Status: SHIPPED | OUTPATIENT
Start: 2022-10-14

## 2022-10-14 RX ORDER — ALPRAZOLAM 0.25 MG/1
0.25 TABLET ORAL AS NEEDED
Qty: 30 TABLET | Refills: 0 | Status: SHIPPED | OUTPATIENT
Start: 2022-10-14

## 2022-10-14 RX ORDER — ESCITALOPRAM OXALATE 20 MG/1
20 TABLET ORAL
Qty: 90 TABLET | Refills: 2 | Status: SHIPPED | OUTPATIENT
Start: 2022-10-14

## 2022-10-14 NOTE — PROGRESS NOTES
Virtual Regular Visit    Verification of patient location:    Patient is located in the following state in which I hold an active license [place]      Assessment/Plan:    Problem List Items Addressed This Visit    None     Visit Diagnoses     Panic disorder    -  Primary    Relevant Medications    escitalopram (LEXAPRO) 20 mg tablet    ALPRAZolam (XANAX) 0 25 mg tablet    hydrOXYzine HCL (ATARAX) 25 mg tablet    Anxiety        Relevant Medications    escitalopram (LEXAPRO) 20 mg tablet    ALPRAZolam (XANAX) 0 25 mg tablet    hydrOXYzine HCL (ATARAX) 25 mg tablet      Recommend trying Atarax as needed for her insomnia at nighttime  No other changes made today         Reason for visit is   Chief Complaint   Patient presents with   • Virtual Regular Visit        Encounter provider Adilene Sanders MD    Provider located at [address] [place] [phone]      Recent Visits  Date Type Provider Dept   10/14/22 Telemedicine Adilene Sanders  [facility] recent visits within past 7 days and meeting all other requirements  Future Appointments  No visits were found meeting these conditions  Showing future appointments within next 150 days and meeting all other requirements       The patient was identified by name and date of birth  Hanna Gordon was informed that this is a telemedicine visit and that the visit is being conducted through [address] [place] [address] and patient was informed that this is a secure, HIPAA-compliant platform  She agrees to proceed     My office door was closed  No one else was in the room  She acknowledged consent and understanding of privacy and security of the video platform  The patient has agreed to participate and understands they can discontinue the visit at any time  Patient is aware this is a billable service  Flaco Smith is a 34 y o  female present via video     Anxiety: 2017 dx; panic attacks was becoming uncontrolled  Since being on Lexapro she improved  Weaned off for pregnancy  But post partum depression occurred  And she has been on it since  Symptoms  Mind is running, over worry  Hasn't really seen thearpist    Xanax 30 pills can last 4-5 months    HPI     Past Medical History:   Diagnosis Date   • Endometriosis 2011   • Gestational diabetes 01/27/2020    during pregnancy       Past Surgical History:   Procedure Laterality Date   • SEPTOPLASTY  2007    Limited FESS       Current Outpatient Medications   Medication Sig Dispense Refill   • ALPRAZolam (XANAX) 0 25 mg tablet Take 1 tablet (0 25 mg total) by mouth as needed for anxiety 30 tablet 0   • escitalopram (LEXAPRO) 20 mg tablet Take 1 tablet (20 mg total) by mouth daily at bedtime 90 tablet 2   • hydrOXYzine HCL (ATARAX) 25 mg tablet 1/2-1 tablet 30 mins before sleep 30 tablet 0   • amitriptyline (ELAVIL) 25 mg tablet Take 1 tablet (25 mg total) by mouth daily at bedtime 90 tablet 0   • fluticasone (FLONASE) 50 mcg/act nasal spray SPRAY 1 SPRAY INTO EACH NOSTRIL EVERY DAY 16 mL 1   • hydrocortisone 2 5 % ointment APPLY A THIN LAYER TO THE AFFECTED AREA(S) 2 TIMES A DAY MON FRI, STOP WHEN CLEAR     • Lo Loestrin Fe 1 MG-10 MCG / 10 MCG TABS Take 1 tablet by mouth daily     • methylPREDNISolone 4 MG tablet therapy pack Use as directed on package 21 each 0   • triamcinolone (KENALOG) 0 1 % cream Apply topically 2 (two) times a day     • valACYclovir (VALTREX) 500 mg tablet Take 500 mg by mouth as needed       No current facility-administered medications for this visit  Allergies   Allergen Reactions   • Cefdinir Hives   • Corylus Hives   • Nuts - Food Allergy Facial Swelling and Hives       Review of Systems   Constitutional: Negative for activity change, appetite change, chills, fatigue and fever  HENT: Negative for congestion  Respiratory: Negative for cough, chest tightness and shortness of breath      Cardiovascular: Negative for chest pain and leg swelling  Gastrointestinal: Negative for abdominal distention, abdominal pain, constipation, diarrhea, nausea and vomiting  Psychiatric/Behavioral: The patient is nervous/anxious  All other systems reviewed and are negative  Video Exam    There were no vitals filed for this visit  Physical Exam  Constitutional:       General: She is not in acute distress  Appearance: She is well-developed  She is not ill-appearing  Pulmonary:      Effort: Pulmonary effort is normal    Musculoskeletal:         General: Normal range of motion  Skin:     Capillary Refill: Capillary refill takes less than 2 seconds  Neurological:      Mental Status: She is alert and oriented to person, place, and time  Psychiatric:         Behavior: Behavior normal          Thought Content:  Thought content normal          Judgment: Judgment normal           I spent 15 minutes directly with the patient during this visit

## 2022-11-05 DIAGNOSIS — F41.0 PANIC DISORDER: ICD-10-CM

## 2022-11-05 DIAGNOSIS — F41.9 ANXIETY: ICD-10-CM

## 2022-11-05 DIAGNOSIS — J01.10 ACUTE NON-RECURRENT FRONTAL SINUSITIS: ICD-10-CM

## 2022-11-05 RX ORDER — FLUTICASONE PROPIONATE 50 MCG
SPRAY, SUSPENSION (ML) NASAL
Qty: 16 ML | Refills: 1 | Status: SHIPPED | OUTPATIENT
Start: 2022-11-05

## 2022-11-05 RX ORDER — HYDROXYZINE HYDROCHLORIDE 25 MG/1
TABLET, FILM COATED ORAL
Qty: 30 TABLET | Refills: 0 | Status: SHIPPED | OUTPATIENT
Start: 2022-11-05

## 2022-12-11 DIAGNOSIS — F41.0 PANIC DISORDER: ICD-10-CM

## 2022-12-11 DIAGNOSIS — F41.9 ANXIETY: ICD-10-CM

## 2022-12-11 RX ORDER — HYDROXYZINE HYDROCHLORIDE 25 MG/1
TABLET, FILM COATED ORAL
Qty: 30 TABLET | Refills: 0 | Status: SHIPPED | OUTPATIENT
Start: 2022-12-11

## 2022-12-15 ENCOUNTER — OFFICE VISIT (OUTPATIENT)
Dept: FAMILY MEDICINE CLINIC | Facility: CLINIC | Age: 32
End: 2022-12-15

## 2022-12-15 VITALS
BODY MASS INDEX: 25.11 KG/M2 | SYSTOLIC BLOOD PRESSURE: 126 MMHG | HEART RATE: 100 BPM | OXYGEN SATURATION: 94 % | RESPIRATION RATE: 14 BRPM | TEMPERATURE: 98.1 F | DIASTOLIC BLOOD PRESSURE: 80 MMHG | HEIGHT: 61 IN | WEIGHT: 133 LBS

## 2022-12-15 DIAGNOSIS — Z13.220 SCREENING CHOLESTEROL LEVEL: ICD-10-CM

## 2022-12-15 DIAGNOSIS — R21 RASH: ICD-10-CM

## 2022-12-15 DIAGNOSIS — M25.50 ARTHRALGIA, UNSPECIFIED JOINT: ICD-10-CM

## 2022-12-15 DIAGNOSIS — H57.9 EYE PRESSURE: ICD-10-CM

## 2022-12-15 DIAGNOSIS — F41.9 ANXIETY: ICD-10-CM

## 2022-12-15 DIAGNOSIS — R53.82 CHRONIC FATIGUE: ICD-10-CM

## 2022-12-15 DIAGNOSIS — B34.9 VIRAL SYNDROME: Primary | ICD-10-CM

## 2022-12-15 RX ORDER — METHYLPREDNISOLONE 4 MG/1
TABLET ORAL
Qty: 21 EACH | Refills: 0 | Status: SHIPPED | OUTPATIENT
Start: 2022-12-15

## 2022-12-15 RX ORDER — TRIAMCINOLONE ACETONIDE 1 MG/G
CREAM TOPICAL 2 TIMES DAILY
Qty: 30 G | Refills: 0 | Status: SHIPPED | OUTPATIENT
Start: 2022-12-15

## 2022-12-15 NOTE — PROGRESS NOTES
Tr Taylor 1990 female MRN: [id number]    [facility] - [address]      ASSESSMENT/PLAN  Tr Taylor is a 34 y o  female presents to the office for    Diagnoses and all orders for this visit:    Viral syndrome    Chronic fatigue  -     TSH, 3rd generation with Free T4 reflex; Future  -     Antinuclear Antibodies (KELIN), IFA; Future  -     Comprehensive metabolic panel; Future  -     CBC and differential; Future  -     Iron Panel (Includes Ferritin, Iron Sat%, Iron, and TIBC); Future  -     TSH, 3rd generation with Free T4 reflex  -     Antinuclear Antibodies (KELIN), IFA  -     Comprehensive metabolic panel  -     CBC and differential    Eye pressure  -     TSH, 3rd generation with Free T4 reflex; Future  -     Antinuclear Antibodies (KELIN), IFA; Future  -     Comprehensive metabolic panel; Future  -     CBC and differential; Future  -     Iron Panel (Includes Ferritin, Iron Sat%, Iron, and TIBC); Future  -     TSH, 3rd generation with Free T4 reflex  -     Antinuclear Antibodies (KELIN), IFA  -     Comprehensive metabolic panel  -     CBC and differential    Anxiety  -     TSH, 3rd generation with Free T4 reflex; Future  -     Antinuclear Antibodies (KELIN), IFA; Future  -     Comprehensive metabolic panel; Future  -     CBC and differential; Future  -     Iron Panel (Includes Ferritin, Iron Sat%, Iron, and TIBC); Future  -     TSH, 3rd generation with Free T4 reflex  -     Antinuclear Antibodies (KELIN), IFA  -     Comprehensive metabolic panel  -     CBC and differential    Arthralgia, unspecified joint  -     TSH, 3rd generation with Free T4 reflex; Future  -     Antinuclear Antibodies (KELIN), IFA; Future  -     Comprehensive metabolic panel; Future  -     CBC and differential; Future  -     Iron Panel (Includes Ferritin, Iron Sat%, Iron, and TIBC);  Future  -     TSH, 3rd generation with Free T4 reflex  -     Antinuclear Antibodies (KELIN), IFA  - Comprehensive metabolic panel  -     CBC and differential    Screening cholesterol level  -     Lipid panel; Future  -     Lipid panel    Rash  -     methylPREDNISolone 4 MG tablet therapy pack; Use as directed on package  -     triamcinolone (KENALOG) 0 1 % cream; Apply topically 2 (two) times a day     viral syndrome already improving  Chronic fatigue eye pressure anxiety with joint pains will do a complete workup on autoimmune diseases  Patient present with eczema recommend Kenalog cream as needed           Future Appointments   Date Time Provider David Motley   1/5/2023  2:00 PM Tab Salazar MD [facility]          SUBJECTIVE  CC: Cough (Congestion, ear aches Negative COVID)      HPI:  Clarence Barahona is a 34 y o  female who presents for     Last week->  Possible FLU A last week  This week, congestino, ear aches  Sinus pressure, pain  States that she is already feeling better with medications over-the-counter  She states that she wanted to discuss at her physical symptoms  About getting tested for possible immunity  She states that she feels like she is not 100% even when she is not sick  She states that she feels fatigued all the time with joint stiffness eye pressure and does not feel like herself  Review of Systems   Constitutional: Positive for activity change and fatigue  Negative for appetite change, chills and fever  HENT: Positive for ear pain, sinus pressure and sinus pain  Negative for congestion  Respiratory: Positive for cough  Negative for chest tightness and shortness of breath  Cardiovascular: Negative for chest pain and leg swelling  Gastrointestinal: Negative for abdominal distention, abdominal pain, constipation, diarrhea, nausea and vomiting  Musculoskeletal: Positive for arthralgias  All other systems reviewed and are negative        Historical Information   The patient history was reviewed as follows:  Past Medical History:   Diagnosis Date   • Endometriosis 2011   • Gestational diabetes 01/27/2020    during pregnancy         Medications:     Current Outpatient Medications:   •  ALPRAZolam (XANAX) 0 25 mg tablet, Take 1 tablet (0 25 mg total) by mouth as needed for anxiety, Disp: 30 tablet, Rfl: 0  •  amitriptyline (ELAVIL) 25 mg tablet, Take 1 tablet (25 mg total) by mouth daily at bedtime, Disp: 90 tablet, Rfl: 0  •  escitalopram (LEXAPRO) 20 mg tablet, Take 1 tablet (20 mg total) by mouth daily at bedtime, Disp: 90 tablet, Rfl: 2  •  fluticasone (FLONASE) 50 mcg/act nasal spray, SPRAY 1 SPRAY INTO EACH NOSTRIL EVERY DAY, Disp: 16 mL, Rfl: 1  •  hydrocortisone 2 5 % ointment, APPLY A THIN LAYER TO THE AFFECTED AREA(S) 2 TIMES A DAY MON FRI, STOP WHEN CLEAR, Disp: , Rfl:   •  hydrOXYzine HCL (ATARAX) 25 mg tablet, 1/2-1 TABLET 30 MINS BEFORE SLEEP, Disp: 30 tablet, Rfl: 0  •  Lo Loestrin Fe 1 MG-10 MCG / 10 MCG TABS, Take 1 tablet by mouth daily, Disp: , Rfl:   •  methylPREDNISolone 4 MG tablet therapy pack, Use as directed on package, Disp: 21 each, Rfl: 0  •  triamcinolone (KENALOG) 0 1 % cream, Apply topically 2 (two) times a day, Disp: , Rfl:   •  triamcinolone (KENALOG) 0 1 % cream, Apply topically 2 (two) times a day, Disp: 30 g, Rfl: 0  •  valACYclovir (VALTREX) 500 mg tablet, Take 500 mg by mouth as needed, Disp: , Rfl:   •  methylPREDNISolone 4 MG tablet therapy pack, Use as directed on package (Patient not taking: Reported on 12/15/2022), Disp: 21 each, Rfl: 0    Allergies   Allergen Reactions   • Cefdinir Hives   • Corylus Hives   • Nuts - Food Allergy Facial Swelling and Hives       OBJECTIVE  Vitals:   Vitals:    12/15/22 1637   BP: 126/80   BP Location: Left arm   Patient Position: Sitting   Cuff Size: Standard   Pulse: 100   Resp: 14   Temp: 98 1 °F (36 7 °C)   SpO2: 94%   Weight: 60 3 kg (133 lb)   Height: 5' 1" (1 549 m)         Physical Exam  Vitals reviewed  Constitutional:       Appearance: She is well-developed     HENT:      Head: Normocephalic and atraumatic  Right Ear: A middle ear effusion is present  Left Ear: A middle ear effusion is present  Mouth/Throat:      Pharynx: No posterior oropharyngeal erythema  Eyes:      Conjunctiva/sclera: Conjunctivae normal       Pupils: Pupils are equal, round, and reactive to light  Cardiovascular:      Rate and Rhythm: Normal rate and regular rhythm  Heart sounds: Normal heart sounds  Pulmonary:      Effort: Pulmonary effort is normal  No respiratory distress  Breath sounds: Normal breath sounds  Musculoskeletal:         General: Normal range of motion  Cervical back: Normal range of motion and neck supple  Skin:     General: Skin is warm  Capillary Refill: Capillary refill takes less than 2 seconds  Neurological:      Mental Status: She is alert and oriented to person, place, and time                      Daisy Packer MD,   [facility]  12/15/2022

## 2023-01-04 DIAGNOSIS — F41.9 ANXIETY: ICD-10-CM

## 2023-01-04 DIAGNOSIS — F41.0 PANIC DISORDER: ICD-10-CM

## 2023-01-04 RX ORDER — HYDROXYZINE HYDROCHLORIDE 25 MG/1
TABLET, FILM COATED ORAL
Qty: 30 TABLET | Refills: 0 | Status: SHIPPED | OUTPATIENT
Start: 2023-01-04

## 2023-01-31 DIAGNOSIS — F41.0 PANIC DISORDER: ICD-10-CM

## 2023-01-31 DIAGNOSIS — F41.9 ANXIETY: ICD-10-CM

## 2023-01-31 RX ORDER — HYDROXYZINE HYDROCHLORIDE 25 MG/1
TABLET, FILM COATED ORAL
Qty: 30 TABLET | Refills: 0 | Status: SHIPPED | OUTPATIENT
Start: 2023-01-31

## 2023-02-06 DIAGNOSIS — R21 RASH: ICD-10-CM

## 2023-02-06 RX ORDER — TRIAMCINOLONE ACETONIDE 1 MG/G
CREAM TOPICAL
Qty: 30 G | Refills: 0 | Status: SHIPPED | OUTPATIENT
Start: 2023-02-06

## 2023-02-18 DIAGNOSIS — F41.0 PANIC DISORDER: ICD-10-CM

## 2023-02-18 DIAGNOSIS — F41.9 ANXIETY: ICD-10-CM

## 2023-02-20 RX ORDER — ALPRAZOLAM 0.25 MG/1
TABLET ORAL
Qty: 30 TABLET | Refills: 0 | Status: SHIPPED | OUTPATIENT
Start: 2023-02-20

## 2023-03-13 ENCOUNTER — OFFICE VISIT (OUTPATIENT)
Dept: FAMILY MEDICINE CLINIC | Facility: CLINIC | Age: 33
End: 2023-03-13

## 2023-03-13 VITALS
HEART RATE: 107 BPM | SYSTOLIC BLOOD PRESSURE: 120 MMHG | TEMPERATURE: 98.6 F | BODY MASS INDEX: 24.55 KG/M2 | DIASTOLIC BLOOD PRESSURE: 80 MMHG | RESPIRATION RATE: 16 BRPM | OXYGEN SATURATION: 99 % | HEIGHT: 61 IN | WEIGHT: 130 LBS

## 2023-03-13 DIAGNOSIS — J32.9 SINUSITIS, UNSPECIFIED CHRONICITY, UNSPECIFIED LOCATION: Primary | ICD-10-CM

## 2023-03-13 RX ORDER — AMOXICILLIN AND CLAVULANATE POTASSIUM 875; 125 MG/1; MG/1
1 TABLET, FILM COATED ORAL EVERY 12 HOURS SCHEDULED
Qty: 14 TABLET | Refills: 0 | Status: SHIPPED | OUTPATIENT
Start: 2023-03-13 | End: 2023-03-23

## 2023-03-13 RX ORDER — METHYLPREDNISOLONE 4 MG/1
TABLET ORAL
Qty: 21 EACH | Refills: 0 | Status: SHIPPED | OUTPATIENT
Start: 2023-03-13

## 2023-03-13 NOTE — PROGRESS NOTES
Duane Cordova 1990 female MRN: [id number]    [address] [place] [address]      ASSESSMENT/PLAN  uDane Cordova is a 34 y o  female presents to the office for    Diagnoses and all orders for this visit:    Sinusitis, unspecified chronicity, unspecified location  -     amoxicillin-clavulanate (AUGMENTIN) 875-125 mg per tablet; Take 1 tablet by mouth every 12 (twelve) hours for 10 days  -     methylPREDNISolone 4 MG tablet therapy pack; Use as directed on package              No future appointments  SUBJECTIVE  CC: Sinusitis      HPI:  Duane Cordova is a 34 y o  female who presents for an acute appointment  Patient states that she started with a sudden onset of sinus pressure sinus pain headaches no appetite and tiredness  Patient states that her symptoms have only been worsening rather than getting better  No one in her home is sick    Review of Systems   Constitutional: Positive for appetite change  Negative for activity change, chills, fatigue and fever  HENT: Positive for congestion  Respiratory: Positive for cough and chest tightness  Negative for shortness of breath  Cardiovascular: Negative for chest pain and leg swelling  Gastrointestinal: Negative for abdominal distention, abdominal pain, constipation, diarrhea, nausea and vomiting  All other systems reviewed and are negative        Historical Information   The patient history was reviewed as follows:  Past Medical History:   Diagnosis Date   • Endometriosis 2011   • Gestational diabetes 01/27/2020    during pregnancy         Medications:     Current Outpatient Medications:   •  ALPRAZolam (XANAX) 0 25 mg tablet, TAKE 1 TABLET BY MOUTH AS NEEDED FOR ANXIETY , Disp: 30 tablet, Rfl: 0  •  amitriptyline (ELAVIL) 25 mg tablet, Take 1 tablet (25 mg total) by mouth daily at bedtime, Disp: 90 tablet, Rfl: 0  •  amoxicillin-clavulanate (AUGMENTIN) 875-125 mg per tablet, Take 1 tablet by mouth every 12 (twelve) hours for 10 days, Disp: 14 tablet, Rfl: 0  •  escitalopram (LEXAPRO) 20 mg tablet, Take 1 tablet (20 mg total) by mouth daily at bedtime, Disp: 90 tablet, Rfl: 2  •  fluticasone (FLONASE) 50 mcg/act nasal spray, SPRAY 1 SPRAY INTO EACH NOSTRIL EVERY DAY, Disp: 16 mL, Rfl: 1  •  hydrocortisone 2 5 % ointment, APPLY A THIN LAYER TO THE AFFECTED AREA(S) 2 TIMES A DAY MON FRI, STOP WHEN CLEAR, Disp: , Rfl:   •  hydrOXYzine HCL (ATARAX) 25 mg tablet, TAKE 1/2 TO 1 TABLET 30 MINUTES BEFORE SLEEP, Disp: 30 tablet, Rfl: 6  •  Lo Loestrin Fe 1 MG-10 MCG / 10 MCG TABS, Take 1 tablet by mouth daily, Disp: , Rfl:   •  methylPREDNISolone 4 MG tablet therapy pack, Use as directed on package, Disp: 21 each, Rfl: 0  •  triamcinolone (KENALOG) 0 1 % cream, Apply topically 2 (two) times a day, Disp: , Rfl:   •  valACYclovir (VALTREX) 500 mg tablet, Take 500 mg by mouth as needed, Disp: , Rfl:     Allergies   Allergen Reactions   • Cefdinir Hives   • Corylus Hives   • Nuts - Food Allergy Facial Swelling and Hives       OBJECTIVE  Vitals:   Vitals:    03/13/23 1050   BP: 120/80   BP Location: Left arm   Patient Position: Sitting   Cuff Size: Standard   Pulse: (!) 107   Resp: 16   Temp: 98 6 °F (37 °C)   SpO2: 99%   Weight: 59 kg (130 lb)   Height: 5' 1" (1 549 m)         Physical Exam  Vitals reviewed  Constitutional:       Appearance: She is well-developed  HENT:      Head: Normocephalic and atraumatic  Eyes:      Conjunctiva/sclera: Conjunctivae normal       Pupils: Pupils are equal, round, and reactive to light  Cardiovascular:      Rate and Rhythm: Normal rate and regular rhythm  Heart sounds: Normal heart sounds  Pulmonary:      Effort: Pulmonary effort is normal  No respiratory distress  Breath sounds: Wheezing present  Musculoskeletal:         General: Normal range of motion  Cervical back: Normal range of motion and neck supple  Skin:     General: Skin is warm  Capillary Refill: Capillary refill takes less than 2 seconds  Neurological:      Mental Status: She is alert and oriented to person, place, and time                      Isaac Ashley MD,   [facility]  3/13/2023

## 2023-03-27 ENCOUNTER — TELEPHONE (OUTPATIENT)
Dept: FAMILY MEDICINE CLINIC | Facility: CLINIC | Age: 33
End: 2023-03-27

## 2023-03-27 DIAGNOSIS — J32.9 SINUSITIS, UNSPECIFIED CHRONICITY, UNSPECIFIED LOCATION: Primary | ICD-10-CM

## 2023-03-27 RX ORDER — FLUCONAZOLE 150 MG/1
150 TABLET ORAL ONCE
Qty: 1 TABLET | Refills: 0 | Status: SHIPPED | OUTPATIENT
Start: 2023-03-27 | End: 2023-03-27

## 2023-03-27 RX ORDER — AZITHROMYCIN 250 MG/1
TABLET, FILM COATED ORAL
Qty: 6 TABLET | Refills: 0 | Status: SHIPPED | OUTPATIENT
Start: 2023-03-27 | End: 2023-03-31

## 2023-03-27 NOTE — TELEPHONE ENCOUNTER
Pt stated she is having the same sxs as before nasal congestion, earpain in both ears and eye pressure  Pt will call the ent   Please aslo see Anjana johnston

## 2023-03-27 NOTE — TELEPHONE ENCOUNTER
Can we see what symptoms she has  I will call in [facility] to see if there is relief  But she might need to see ENT  DOes she have an ENT doctor?  If not can we give her info for Bridgette

## 2023-05-25 ENCOUNTER — TELEMEDICINE (OUTPATIENT)
Dept: FAMILY MEDICINE CLINIC | Facility: CLINIC | Age: 33
End: 2023-05-25

## 2023-05-25 DIAGNOSIS — B34.9 VIRAL SYNDROME: Primary | ICD-10-CM

## 2023-05-25 RX ORDER — AZITHROMYCIN 250 MG/1
TABLET, FILM COATED ORAL
Qty: 6 TABLET | Refills: 0 | Status: SHIPPED | OUTPATIENT
Start: 2023-05-25 | End: 2023-05-29

## 2023-05-25 NOTE — PROGRESS NOTES
Virtual Regular Visit    Verification of patient location:    Patient is located at Home in the following state in which I hold an active license [place]      Assessment/Plan:    Problem List Items Addressed This Visit    None  Visit Diagnoses     Viral syndrome    -  Primary    Relevant Medications    azithromycin (ZITHROMAX) 250 mg tablet        Patient likely suffering from a viral infection  Advised the patient that this is likely a dental virus  Pocket prescription only given in the setting of it being a holiday weekend  However avoid using if possible unless starting with a fever again       Reason for visit is   Chief Complaint   Patient presents with   • Virtual Regular Visit        Encounter provider Prasanna Roman MD    Provider located at [address] [place] [phone]      Recent Visits  No visits were found meeting these conditions  Showing recent visits within past 7 days and meeting all other requirements  Today's Visits  Date Type Provider Dept   05/25/23 Telemedicine Prasanna Roman MD [address] [place] [address] today's visits and meeting all other requirements  Future Appointments  No visits were found meeting these conditions  Showing future appointments within next 150 days and meeting all other requirements       The patient was identified by name and date of birth  Miguel Laughter was informed that this is a telemedicine visit and that the visit is being conducted through the [facility] platform  She agrees to proceed     My office door was closed  No one else was in the room  She acknowledged consent and understanding of privacy and security of the video platform  The patient has agreed to participate and understands they can discontinue the visit at any time  Patient is aware this is a billable service  Gurpreet Camara is a 34 y o  female presents via video    Her son was sick Friday night  She started with symptoms Friday then started getting better  But Sunday-Monday  Got worse, hot flashes, diarrhea, congestion, sore throat, ear congestion  HPI     Past Medical History:   Diagnosis Date   • Endometriosis 2011   • Gestational diabetes 01/27/2020    during pregnancy       Past Surgical History:   Procedure Laterality Date   • SEPTOPLASTY  2007    Limited FESS       Current Outpatient Medications   Medication Sig Dispense Refill   • azithromycin (ZITHROMAX) 250 mg tablet Take 2 tablets today then 1 tablet daily x 4 days 6 tablet 0   • ALPRAZolam (XANAX) 0 25 mg tablet TAKE 1 TABLET BY MOUTH AS NEEDED FOR ANXIETY  30 tablet 0   • amitriptyline (ELAVIL) 25 mg tablet Take 1 tablet (25 mg total) by mouth daily at bedtime 90 tablet 0   • escitalopram (LEXAPRO) 20 mg tablet Take 1 tablet (20 mg total) by mouth daily at bedtime 90 tablet 2   • fluticasone (FLONASE) 50 mcg/act nasal spray SPRAY 1 SPRAY INTO EACH NOSTRIL EVERY DAY 16 mL 1   • hydrocortisone 2 5 % ointment APPLY A THIN LAYER TO THE AFFECTED AREA(S) 2 TIMES A DAY MON FRI, STOP WHEN CLEAR     • hydrOXYzine HCL (ATARAX) 25 mg tablet TAKE 1/2 TO 1 TABLET 30 MINUTES BEFORE SLEEP 30 tablet 6   • Lo Loestrin Fe 1 MG-10 MCG / 10 MCG TABS Take 1 tablet by mouth daily     • methylPREDNISolone 4 MG tablet therapy pack Use as directed on package 21 each 0   • triamcinolone (KENALOG) 0 1 % cream Apply topically 2 (two) times a day     • valACYclovir (VALTREX) 500 mg tablet Take 500 mg by mouth as needed       No current facility-administered medications for this visit  Allergies   Allergen Reactions   • Cefdinir Hives   • Corylus Hives   • Nuts - Food Allergy Facial Swelling and Hives       Review of Systems    Video Exam    There were no vitals filed for this visit  Physical Exam  Constitutional:       Appearance: Normal appearance  Pulmonary:      Effort: Pulmonary effort is normal    Musculoskeletal:         General: Normal range of motion     Neurological: General: No focal deficit present  Mental Status: She is alert and oriented to person, place, and time  Psychiatric:         Mood and Affect: Mood normal          Behavior: Behavior normal          Thought Content:  Thought content normal          Judgment: Judgment normal           Visit Time  Total Visit Duration: 15

## 2023-05-31 ENCOUNTER — TELEPHONE (OUTPATIENT)
Dept: FAMILY MEDICINE CLINIC | Facility: CLINIC | Age: 33
End: 2023-05-31

## 2023-05-31 NOTE — TELEPHONE ENCOUNTER
Patient left voicemail stating that she had an appointment with you Friday 05/25 and now she is having abdominal pain, stomach cramping, hemorrhoids due to diarrhea, black stools and no appetite  She wanted to know if she should have an appointment or go to ER  Please advise

## 2023-05-31 NOTE — TELEPHONE ENCOUNTER
Dr Judy Becerra:    Patient is taking pepto and her pain level is at a 5  She is still feeling nausea and sore throat sxs  She was advised that pepto will make her stool black  She said she is feeling much better than yesterday, but will call if her sxs worsen

## 2023-06-08 DIAGNOSIS — F41.9 ANXIETY: ICD-10-CM

## 2023-06-08 DIAGNOSIS — F41.0 PANIC DISORDER: ICD-10-CM

## 2023-06-09 RX ORDER — ALPRAZOLAM 0.25 MG/1
TABLET ORAL
Qty: 30 TABLET | Refills: 0 | Status: SHIPPED | OUTPATIENT
Start: 2023-06-09

## 2023-06-19 ENCOUNTER — OFFICE VISIT (OUTPATIENT)
Dept: FAMILY MEDICINE CLINIC | Facility: CLINIC | Age: 33
End: 2023-06-19
Payer: COMMERCIAL

## 2023-06-19 VITALS
RESPIRATION RATE: 16 BRPM | OXYGEN SATURATION: 99 % | WEIGHT: 131 LBS | HEART RATE: 117 BPM | TEMPERATURE: 99 F | DIASTOLIC BLOOD PRESSURE: 100 MMHG | SYSTOLIC BLOOD PRESSURE: 140 MMHG | HEIGHT: 61 IN | BODY MASS INDEX: 24.73 KG/M2

## 2023-06-19 DIAGNOSIS — J40 BRONCHITIS: Primary | ICD-10-CM

## 2023-06-19 PROCEDURE — 99213 OFFICE O/P EST LOW 20 MIN: CPT | Performed by: FAMILY MEDICINE

## 2023-06-19 RX ORDER — FLUCONAZOLE 150 MG/1
150 TABLET ORAL ONCE
COMMUNITY
Start: 2023-03-27

## 2023-06-19 RX ORDER — AMOXICILLIN AND CLAVULANATE POTASSIUM 875; 125 MG/1; MG/1
1 TABLET, FILM COATED ORAL EVERY 12 HOURS SCHEDULED
Qty: 14 TABLET | Refills: 0 | Status: SHIPPED | OUTPATIENT
Start: 2023-06-19 | End: 2023-06-26

## 2023-06-19 RX ORDER — METHYLPREDNISOLONE 4 MG/1
TABLET ORAL
Qty: 21 EACH | Refills: 0 | Status: SHIPPED | OUTPATIENT
Start: 2023-06-19

## 2023-06-19 RX ORDER — ALBUTEROL SULFATE 90 UG/1
2 AEROSOL, METERED RESPIRATORY (INHALATION) EVERY 6 HOURS PRN
Qty: 6.7 G | Refills: 5 | Status: SHIPPED | OUTPATIENT
Start: 2023-06-19

## 2023-06-19 RX ORDER — NAPROXEN 500 MG/1
500 TABLET ORAL EVERY 12 HOURS
COMMUNITY
Start: 2023-04-25

## 2023-06-19 RX ORDER — TAMSULOSIN HYDROCHLORIDE 0.4 MG/1
CAPSULE ORAL
COMMUNITY
Start: 2023-04-25 | End: 2023-06-19

## 2023-06-19 RX ORDER — OXYCODONE HYDROCHLORIDE AND ACETAMINOPHEN 5; 325 MG/1; MG/1
TABLET ORAL
COMMUNITY
Start: 2023-04-25 | End: 2023-06-19

## 2023-06-19 RX ORDER — BUDESONIDE AND FORMOTEROL FUMARATE DIHYDRATE 160; 4.5 UG/1; UG/1
2 AEROSOL RESPIRATORY (INHALATION) 2 TIMES DAILY
Qty: 10.2 G | Refills: 0 | Status: SHIPPED | OUTPATIENT
Start: 2023-06-19

## 2023-06-19 NOTE — PROGRESS NOTES
Pablo Query 1990 female MRN: [id number]    [facility] - [address]      ASSESSMENT/PLAN  Pablo Query is a 34 y o  female presents to the office for    Diagnoses and all orders for this visit:    Bronchitis  -     methylPREDNISolone 4 MG tablet therapy pack; Use as directed on package  -     amoxicillin-clavulanate (AUGMENTIN) 875-125 mg per tablet; Take 1 tablet by mouth every 12 (twelve) hours for 7 days  -     budesonide-formoterol (Symbicort) 160-4 5 mcg/act inhaler; Inhale 2 puffs 2 (two) times a day Rinse mouth after use  -     albuterol (Proventil HFA) 90 mcg/act inhaler; Inhale 2 puffs every 6 (six) hours as needed for wheezing  -     XR chest pa & lateral; Future    Other orders  -     fluconazole (DIFLUCAN) 150 mg tablet; Take 150 mg by mouth once (Patient not taking: Reported on 6/19/2023)  -     naproxen (NAPROSYN) 500 mg tablet; Take 500 mg by mouth every 12 (twelve) hours (Patient not taking: Reported on 6/19/2023)  -     Discontinue: oxyCODONE-acetaminophen (PERCOCET) 5-325 mg per tablet; TAKE ONE TABLET BY MOUTH EVERY 6 HOURS - DO NOT DRIVE OR USE HEAVY EQUIPMENT WHILE TAKING THIS MED (Patient not taking: Reported on 6/19/2023)  -     Discontinue: tamsulosin (FLOMAX) 0 4 mg; TAKE 1 CAPSULE BY MOUTH DAILY - TAKE 30 MINUTES AFTER AS MEAL (Patient not taking: Reported on 6/19/2023)                No future appointments  SUBJECTIVE  CC: Sinusitis (Pt used Z-Mj that did not help )      HPI:  Pablo Query is a 34 y o  female who presents for an acute appointment  Patient with acute sinusitis patient started a Z-Mj with no improvement  Patient states that the cough is very productive and she is unable to take big deep breaths without coughing spells  She states that she believes she developed bronchitis  Denies any fevers or chills but does get sweaty when she is going through coughing fits    Review of Systems Constitutional: Negative for activity change, appetite change, chills, fatigue and fever  HENT: Negative for congestion  Respiratory: Positive for cough  Negative for chest tightness and shortness of breath  Cardiovascular: Negative for chest pain and leg swelling  Gastrointestinal: Negative for abdominal distention, abdominal pain, constipation, diarrhea, nausea and vomiting  All other systems reviewed and are negative        Historical Information   The patient history was reviewed as follows:  Past Medical History:   Diagnosis Date   • Endometriosis 2011   • Gestational diabetes 01/27/2020    during pregnancy         Medications:     Current Outpatient Medications:   •  albuterol (Proventil HFA) 90 mcg/act inhaler, Inhale 2 puffs every 6 (six) hours as needed for wheezing, Disp: 6 7 g, Rfl: 5  •  ALPRAZolam (XANAX) 0 25 mg tablet, TAKE 1 TABLET BY MOUTH AS NEEDED FOR ANXIETY, Disp: 30 tablet, Rfl: 0  •  amitriptyline (ELAVIL) 25 mg tablet, Take 1 tablet (25 mg total) by mouth daily at bedtime, Disp: 90 tablet, Rfl: 0  •  amoxicillin-clavulanate (AUGMENTIN) 875-125 mg per tablet, Take 1 tablet by mouth every 12 (twelve) hours for 7 days, Disp: 14 tablet, Rfl: 0  •  budesonide-formoterol (Symbicort) 160-4 5 mcg/act inhaler, Inhale 2 puffs 2 (two) times a day Rinse mouth after use , Disp: 10 2 g, Rfl: 0  •  escitalopram (LEXAPRO) 20 mg tablet, Take 1 tablet (20 mg total) by mouth daily at bedtime, Disp: 90 tablet, Rfl: 2  •  fluticasone (FLONASE) 50 mcg/act nasal spray, SPRAY 1 SPRAY INTO EACH NOSTRIL EVERY DAY, Disp: 16 mL, Rfl: 1  •  hydrocortisone 2 5 % ointment, APPLY A THIN LAYER TO THE AFFECTED AREA(S) 2 TIMES A DAY MON FRI, STOP WHEN CLEAR, Disp: , Rfl:   •  hydrOXYzine HCL (ATARAX) 25 mg tablet, TAKE 1/2 TO 1 TABLET 30 MINUTES BEFORE SLEEP, Disp: 30 tablet, Rfl: 6  •  Lo Loestrin Fe 1 MG-10 MCG / 10 MCG TABS, Take 1 tablet by mouth daily, Disp: , Rfl:   •  methylPREDNISolone 4 MG tablet therapy "pack, Use as directed on package, Disp: 21 each, Rfl: 0  •  triamcinolone (KENALOG) 0 1 % cream, Apply topically 2 (two) times a day, Disp: , Rfl:   •  valACYclovir (VALTREX) 500 mg tablet, Take 500 mg by mouth as needed, Disp: , Rfl:   •  fluconazole (DIFLUCAN) 150 mg tablet, Take 150 mg by mouth once (Patient not taking: Reported on 6/19/2023), Disp: , Rfl:   •  methylPREDNISolone 4 MG tablet therapy pack, Use as directed on package (Patient not taking: Reported on 6/19/2023), Disp: 21 each, Rfl: 0  •  naproxen (NAPROSYN) 500 mg tablet, Take 500 mg by mouth every 12 (twelve) hours (Patient not taking: Reported on 6/19/2023), Disp: , Rfl:     Allergies   Allergen Reactions   • Cefdinir Hives   • Corylus Hives   • Nuts - Food Allergy Facial Swelling and Hives       OBJECTIVE  Vitals:   Vitals:    06/19/23 1142   BP: 140/100   BP Location: Left arm   Patient Position: Sitting   Cuff Size: Standard   Pulse: (!) 117   Resp: 16   Temp: 99 °F (37 2 °C)   SpO2: 99%   Weight: 59 4 kg (131 lb)   Height: 5' 1\" (1 549 m)         Physical Exam  Vitals reviewed  Constitutional:       Appearance: She is well-developed  HENT:      Head: Normocephalic and atraumatic  Eyes:      Conjunctiva/sclera: Conjunctivae normal       Pupils: Pupils are equal, round, and reactive to light  Cardiovascular:      Rate and Rhythm: Normal rate and regular rhythm  Heart sounds: Normal heart sounds  Pulmonary:      Effort: Pulmonary effort is normal  No respiratory distress  Breath sounds: Wheezing present  Musculoskeletal:         General: Normal range of motion  Cervical back: Normal range of motion and neck supple  Skin:     General: Skin is warm  Capillary Refill: Capillary refill takes less than 2 seconds  Neurological:      Mental Status: She is alert and oriented to person, place, and time                      Guanakito Calderon MD,   [facility]  6/23/2023      "

## 2023-06-20 ENCOUNTER — APPOINTMENT (OUTPATIENT)
Dept: RADIOLOGY | Facility: CLINIC | Age: 33
End: 2023-06-20
Payer: COMMERCIAL

## 2023-06-20 DIAGNOSIS — J40 BRONCHITIS: ICD-10-CM

## 2023-06-20 PROCEDURE — 71046 X-RAY EXAM CHEST 2 VIEWS: CPT

## 2023-07-16 DIAGNOSIS — J40 BRONCHITIS: ICD-10-CM

## 2023-07-17 RX ORDER — BUDESONIDE AND FORMOTEROL FUMARATE DIHYDRATE 160; 4.5 UG/1; UG/1
AEROSOL RESPIRATORY (INHALATION)
Qty: 18 G | Refills: 1 | Status: SHIPPED | OUTPATIENT
Start: 2023-07-17

## 2023-08-22 DIAGNOSIS — F41.0 PANIC DISORDER: ICD-10-CM

## 2023-08-22 DIAGNOSIS — F41.9 ANXIETY: ICD-10-CM

## 2023-08-22 RX ORDER — ALPRAZOLAM 0.25 MG/1
TABLET ORAL
Qty: 30 TABLET | Refills: 0 | Status: SHIPPED | OUTPATIENT
Start: 2023-08-22

## 2023-10-02 ENCOUNTER — TELEMEDICINE (OUTPATIENT)
Dept: FAMILY MEDICINE CLINIC | Facility: CLINIC | Age: 33
End: 2023-10-02
Payer: COMMERCIAL

## 2023-10-02 ENCOUNTER — TELEPHONE (OUTPATIENT)
Age: 33
End: 2023-10-02

## 2023-10-02 DIAGNOSIS — R09.81 SINUS CONGESTION: Primary | ICD-10-CM

## 2023-10-02 PROCEDURE — 99213 OFFICE O/P EST LOW 20 MIN: CPT | Performed by: FAMILY MEDICINE

## 2023-10-02 RX ORDER — AMOXICILLIN AND CLAVULANATE POTASSIUM 875; 125 MG/1; MG/1
1 TABLET, FILM COATED ORAL EVERY 12 HOURS SCHEDULED
Qty: 14 TABLET | Refills: 0 | Status: SHIPPED | OUTPATIENT
Start: 2023-10-02 | End: 2023-10-09

## 2023-10-02 RX ORDER — BUDESONIDE AND FORMOTEROL FUMARATE DIHYDRATE 160; 4.5 UG/1; UG/1
2 AEROSOL RESPIRATORY (INHALATION) 2 TIMES DAILY
Qty: 18 G | Refills: 1 | Status: SHIPPED | OUTPATIENT
Start: 2023-10-02

## 2023-10-02 RX ORDER — METHYLPREDNISOLONE 4 MG/1
TABLET ORAL
Qty: 21 EACH | Refills: 0 | Status: SHIPPED | OUTPATIENT
Start: 2023-10-02

## 2023-10-02 NOTE — PROGRESS NOTES
Virtual Regular Visit    Verification of patient location:    Patient is located at Home in the following state in which I hold an active license [place]      Assessment/Plan:    Problem List Items Addressed This Visit    None  Visit Diagnoses     Sinus congestion    -  Primary    Relevant Medications    budesonide-formoterol (Symbicort) 160-4.5 mcg/act inhaler    methylPREDNISolone 4 MG tablet therapy pack    amoxicillin-clavulanate (AUGMENTIN) 875-125 mg per tablet        Recommend starting Symbicort to avoid Bronchitis in the future. Patient agrees  Recommend treatment above, if no improvement will see her in the office for a f/u  Please overbook if she calls OK by me         Reason for visit is   Chief Complaint   Patient presents with   • Virtual Regular Visit        Encounter provider Claudell Mulders, MD    Provider located at [address] [address]      Recent Visits  No visits were found meeting these conditions. Showing recent visits within past 7 days and meeting all other requirements  Today's Visits  Date Type Provider Dept   10/02/23 Telemedicine Claudell Mulders, MD [address] [address] today's visits and meeting all other requirements  Future Appointments  No visits were found meeting these conditions. Showing future appointments within next 150 days and meeting all other requirements       The patient was identified by name and date of birth. Ginette Hutchison was informed that this is a telemedicine visit and that the visit is being conducted through the [address] [place] [address] Now platform. She agrees to proceed. .  My office door was closed. No one else was in the room. She acknowledged consent and understanding of privacy and security of the video platform. The patient has agreed to participate and understands they can discontinue the visit at any time. Patient is aware this is a billable service.      Terrence Coulter is a 34 y.o. female present via video. Sinus pressure, head pressure, ear pain, congestion. Took old pain killer from the pressure. NO fevers  No constipation    HPI     Past Medical History:   Diagnosis Date   • Endometriosis 2011   • Gestational diabetes 01/27/2020    during pregnancy       Past Surgical History:   Procedure Laterality Date   • SEPTOPLASTY  2007    Limited FESS       Current Outpatient Medications   Medication Sig Dispense Refill   • amoxicillin-clavulanate (AUGMENTIN) 875-125 mg per tablet Take 1 tablet by mouth every 12 (twelve) hours for 7 days 14 tablet 0   • budesonide-formoterol (Symbicort) 160-4.5 mcg/act inhaler Inhale 2 puffs 2 (two) times a day Rinse mouth after use.  18 g 1   • methylPREDNISolone 4 MG tablet therapy pack Use as directed on package 21 each 0   • albuterol (Proventil HFA) 90 mcg/act inhaler Inhale 2 puffs every 6 (six) hours as needed for wheezing 6.7 g 5   • ALPRAZolam (XANAX) 0.25 mg tablet TAKE 1 TABLET BY MOUTH AS NEEDED FOR ANXIETY 30 tablet 0   • amitriptyline (ELAVIL) 25 mg tablet Take 1 tablet (25 mg total) by mouth daily at bedtime 90 tablet 0   • escitalopram (LEXAPRO) 20 mg tablet TAKE 1 TABLET BY MOUTH DAILY AT BEDTIME 90 tablet 3   • fluconazole (DIFLUCAN) 150 mg tablet Take 150 mg by mouth once (Patient not taking: Reported on 6/19/2023)     • fluticasone (FLONASE) 50 mcg/act nasal spray SPRAY 1 SPRAY INTO EACH NOSTRIL EVERY DAY 16 mL 1   • hydrocortisone 2.5 % ointment APPLY A THIN LAYER TO THE AFFECTED AREA(S) 2 TIMES A DAY MON FRI, STOP WHEN CLEAR     • hydrOXYzine HCL (ATARAX) 25 mg tablet TAKE 1/2 TO 1 TABLET 30 MINUTES BEFORE SLEEP 30 tablet 6   • Lo Loestrin Fe 1 MG-10 MCG / 10 MCG TABS Take 1 tablet by mouth daily     • naproxen (NAPROSYN) 500 mg tablet Take 500 mg by mouth every 12 (twelve) hours (Patient not taking: Reported on 6/19/2023)     • triamcinolone (KENALOG) 0.1 % cream Apply topically 2 (two) times a day     • valACYclovir (VALTREX) 500 mg tablet Take 500 mg by mouth as needed       No current facility-administered medications for this visit. Allergies   Allergen Reactions   • Cefdinir Hives   • Corylus Hives   • Nuts - Food Allergy Facial Swelling and Hives       Review of Systems   Constitutional: Negative for activity change, appetite change, chills, fatigue and fever. HENT: Positive for congestion, ear pain, sinus pressure and sinus pain. Respiratory: Positive for cough. Negative for chest tightness and shortness of breath. Cardiovascular: Negative for chest pain and leg swelling. Gastrointestinal: Negative for abdominal distention, abdominal pain, constipation, diarrhea, nausea and vomiting. All other systems reviewed and are negative. Video Exam    There were no vitals filed for this visit. Physical Exam  Constitutional:       Appearance: Normal appearance. HENT:      Nose: Congestion present. Pulmonary:      Effort: Pulmonary effort is normal.   Musculoskeletal:         General: Normal range of motion. Neurological:      General: No focal deficit present. Mental Status: She is alert and oriented to person, place, and time. Psychiatric:         Mood and Affect: Mood normal.         Behavior: Behavior normal.         Thought Content:  Thought content normal.         Judgment: Judgment normal.          Visit Time  Total Visit Duration: 15

## 2023-10-02 NOTE — TELEPHONE ENCOUNTER
Patient called with symptoms of sinus congestion/pressure headache, ear and eye pain, sore throat and feeling drained since Thursday. Patient only wanted to see Dr. Jennifer Cullen, as she stated she knows the history with her sinuses. Called practice to see if they could accommodate patient as schedule appeared to be full.   Practice will speak with the patient to further assist.

## 2023-10-11 ENCOUNTER — TELEPHONE (OUTPATIENT)
Age: 33
End: 2023-10-11

## 2023-10-11 ENCOUNTER — OFFICE VISIT (OUTPATIENT)
Dept: FAMILY MEDICINE CLINIC | Facility: CLINIC | Age: 33
End: 2023-10-11
Payer: COMMERCIAL

## 2023-10-11 VITALS
OXYGEN SATURATION: 98 % | DIASTOLIC BLOOD PRESSURE: 80 MMHG | WEIGHT: 130 LBS | SYSTOLIC BLOOD PRESSURE: 130 MMHG | TEMPERATURE: 96.8 F | RESPIRATION RATE: 18 BRPM | BODY MASS INDEX: 24.55 KG/M2 | HEART RATE: 103 BPM | HEIGHT: 61 IN

## 2023-10-11 DIAGNOSIS — R53.83 OTHER FATIGUE: ICD-10-CM

## 2023-10-11 DIAGNOSIS — J02.9 SORE THROAT: Primary | ICD-10-CM

## 2023-10-11 PROCEDURE — 99213 OFFICE O/P EST LOW 20 MIN: CPT | Performed by: FAMILY MEDICINE

## 2023-10-11 RX ORDER — AZITHROMYCIN 250 MG/1
TABLET, FILM COATED ORAL
Qty: 6 TABLET | Refills: 0 | Status: SHIPPED | OUTPATIENT
Start: 2023-10-11 | End: 2023-10-13

## 2023-10-11 NOTE — TELEPHONE ENCOUNTER
Pharmacy called stating that there was an interaction between lexapro and zpak. RANI kurtz tested Dr. Macey Guzman and she stated that it was fine to refill.   No further action at this time  Jaquelin Page

## 2023-10-11 NOTE — PROGRESS NOTES
Evon Demarco 1990 female MRN: [id number]    [facility]      ASSESSMENT/PLAN  Evon Demarco is a 28 y.o. female presents to the office for    1. Sore throat    - Ruben Inks Virus Antibody Panel; Future  - CBC and differential; Future  - Comprehensive metabolic panel; Future  - Throat culture; Future  - CBC and differential  - Comprehensive metabolic panel  - azithromycin (ZITHROMAX) 250 mg tablet; Take 2 tablets today then 1 tablet daily x 4 days  Dispense: 6 tablet; Refill: 0    2. Other fatigue    - Ruben Inks Virus Antibody Panel; Future  - CBC and differential; Future  - Comprehensive metabolic panel; Future  - CBC and differential  - Comprehensive metabolic panel   Cannot confirm that maybe the patient possibly had mono/COVID/flu. At this time recommend to send for blood work to be sure that there is no other etiology. We will send for throat culture as well. LUBNA-Mj for coverage does have lymph node swelling on the left side of her neck sure there         No future appointments. SUBJECTIVE  CC: Sinusitis (Sore throat, fatigue, nausea )      HPI:  Evon Demarco is a 28 y.o. female who presents for an acute appointment. Patient was seen on October 2 and has had no improvement to her symptoms. She states that she continues to have sore throat fatigue and nausea every morning. Patient states that she felt like was a sinus infection and usually Medrol pack and Augmentin work but this time around it did not. Her son was sick at the same time as well. Review of Systems   Constitutional:  Positive for fatigue. Negative for activity change, appetite change, chills and fever. HENT:  Positive for sore throat. Negative for congestion. Respiratory:  Negative for cough, chest tightness and shortness of breath. Cardiovascular:  Negative for chest pain and leg swelling.    Gastrointestinal:  Positive for nausea. Negative for abdominal distention, abdominal pain, constipation, diarrhea and vomiting. All other systems reviewed and are negative. Historical Information   The patient history was reviewed as follows:  Past Medical History:   Diagnosis Date   • Endometriosis 2011   • Gestational diabetes 01/27/2020    during pregnancy         Medications:     Current Outpatient Medications:   •  albuterol (Proventil HFA) 90 mcg/act inhaler, Inhale 2 puffs every 6 (six) hours as needed for wheezing, Disp: 6.7 g, Rfl: 5  •  ALPRAZolam (XANAX) 0.25 mg tablet, TAKE 1 TABLET BY MOUTH AS NEEDED FOR ANXIETY, Disp: 30 tablet, Rfl: 0  •  amitriptyline (ELAVIL) 25 mg tablet, Take 1 tablet (25 mg total) by mouth daily at bedtime, Disp: 90 tablet, Rfl: 0  •  azithromycin (ZITHROMAX) 250 mg tablet, Take 2 tablets today then 1 tablet daily x 4 days, Disp: 6 tablet, Rfl: 0  •  escitalopram (LEXAPRO) 20 mg tablet, TAKE 1 TABLET BY MOUTH DAILY AT BEDTIME, Disp: 90 tablet, Rfl: 3  •  fluticasone (FLONASE) 50 mcg/act nasal spray, SPRAY 1 SPRAY INTO EACH NOSTRIL EVERY DAY, Disp: 16 mL, Rfl: 1  •  hydrocortisone 2.5 % ointment, APPLY A THIN LAYER TO THE AFFECTED AREA(S) 2 TIMES A DAY MON FRI, STOP WHEN CLEAR, Disp: , Rfl:   •  hydrOXYzine HCL (ATARAX) 25 mg tablet, TAKE 1/2 TO 1 TABLET 30 MINUTES BEFORE SLEEP, Disp: 30 tablet, Rfl: 6  •  Lo Loestrin Fe 1 MG-10 MCG / 10 MCG TABS, Take 1 tablet by mouth daily, Disp: , Rfl:   •  triamcinolone (KENALOG) 0.1 % cream, Apply topically 2 (two) times a day, Disp: , Rfl:   •  valACYclovir (VALTREX) 500 mg tablet, Take 500 mg by mouth as needed, Disp: , Rfl:   •  budesonide-formoterol (Symbicort) 160-4.5 mcg/act inhaler, Inhale 2 puffs 2 (two) times a day Rinse mouth after use.  (Patient not taking: Reported on 10/11/2023), Disp: 18 g, Rfl: 1  •  fluconazole (DIFLUCAN) 150 mg tablet, Take 150 mg by mouth once (Patient not taking: Reported on 6/19/2023), Disp: , Rfl:   •  methylPREDNISolone 4 MG tablet therapy pack, Use as directed on package (Patient not taking: Reported on 10/11/2023), Disp: 21 each, Rfl: 0  •  naproxen (NAPROSYN) 500 mg tablet, Take 500 mg by mouth every 12 (twelve) hours (Patient not taking: Reported on 6/19/2023), Disp: , Rfl:     Allergies   Allergen Reactions   • Cefdinir Hives   • Corylus Hives   • Nuts - Food Allergy Facial Swelling and Hives       OBJECTIVE  Vitals:   Vitals:    10/11/23 1328   BP: 130/80   BP Location: Right arm   Patient Position: Sitting   Cuff Size: Standard   Pulse: 103   Resp: 18   Temp: (!) 96.8 °F (36 °C)   SpO2: 98%   Weight: 59 kg (130 lb)   Height: 5' 1" (1.549 m)         Physical Exam  Vitals reviewed. Constitutional:       Appearance: She is well-developed. HENT:      Head: Normocephalic and atraumatic. Right Ear: Tympanic membrane, ear canal and external ear normal.      Left Ear: Tympanic membrane, ear canal and external ear normal.      Mouth/Throat:      Pharynx: Posterior oropharyngeal erythema present. Eyes:      Conjunctiva/sclera: Conjunctivae normal.      Pupils: Pupils are equal, round, and reactive to light. Neck:      Comments: Swollen lymph nodes on the left  Cardiovascular:      Rate and Rhythm: Normal rate and regular rhythm. Heart sounds: Normal heart sounds. Pulmonary:      Effort: Pulmonary effort is normal. No respiratory distress. Breath sounds: Normal breath sounds. Musculoskeletal:         General: Normal range of motion. Cervical back: Normal range of motion and neck supple. Skin:     General: Skin is warm. Capillary Refill: Capillary refill takes less than 2 seconds. Neurological:      Mental Status: She is alert and oriented to person, place, and time.                     Anna Ellis MD,   [facility]  10/11/2023

## 2023-10-12 LAB
ALBUMIN SERPL-MCNC: 5.1 G/DL (ref 3.9–4.9)
ALBUMIN/GLOB SERPL: 1.9 {RATIO} (ref 1.2–2.2)
ALP SERPL-CCNC: 56 IU/L (ref 44–121)
ALT SERPL-CCNC: 9 IU/L (ref 0–32)
AST SERPL-CCNC: 14 IU/L (ref 0–40)
BASOPHILS # BLD AUTO: 0.1 X10E3/UL (ref 0–0.2)
BASOPHILS NFR BLD AUTO: 1 %
BILIRUB SERPL-MCNC: 0.7 MG/DL (ref 0–1.2)
BUN SERPL-MCNC: 8 MG/DL (ref 6–20)
BUN/CREAT SERPL: 10 (ref 9–23)
CALCIUM SERPL-MCNC: 10.2 MG/DL (ref 8.7–10.2)
CHLORIDE SERPL-SCNC: 100 MMOL/L (ref 96–106)
CO2 SERPL-SCNC: 23 MMOL/L (ref 20–29)
CREAT SERPL-MCNC: 0.81 MG/DL (ref 0.57–1)
EGFR: 98 ML/MIN/1.73
EOSINOPHIL # BLD AUTO: 0.1 X10E3/UL (ref 0–0.4)
EOSINOPHIL NFR BLD AUTO: 1 %
ERYTHROCYTE [DISTWIDTH] IN BLOOD BY AUTOMATED COUNT: 11.2 % (ref 11.7–15.4)
GLOBULIN SER-MCNC: 2.7 G/DL (ref 1.5–4.5)
GLUCOSE SERPL-MCNC: 85 MG/DL (ref 70–99)
HCT VFR BLD AUTO: 45.2 % (ref 34–46.6)
HGB BLD-MCNC: 15 G/DL (ref 11.1–15.9)
IMM GRANULOCYTES # BLD: 0 X10E3/UL (ref 0–0.1)
IMM GRANULOCYTES NFR BLD: 0 %
LYMPHOCYTES # BLD AUTO: 3.3 X10E3/UL (ref 0.7–3.1)
LYMPHOCYTES NFR BLD AUTO: 34 %
MCH RBC QN AUTO: 30.8 PG (ref 26.6–33)
MCHC RBC AUTO-ENTMCNC: 33.2 G/DL (ref 31.5–35.7)
MCV RBC AUTO: 93 FL (ref 79–97)
MONOCYTES # BLD AUTO: 0.4 X10E3/UL (ref 0.1–0.9)
MONOCYTES NFR BLD AUTO: 4 %
NEUTROPHILS # BLD AUTO: 6.1 X10E3/UL (ref 1.4–7)
NEUTROPHILS NFR BLD AUTO: 60 %
PLATELET # BLD AUTO: 373 X10E3/UL (ref 150–450)
POTASSIUM SERPL-SCNC: 4.7 MMOL/L (ref 3.5–5.2)
PROT SERPL-MCNC: 7.8 G/DL (ref 6–8.5)
RBC # BLD AUTO: 4.87 X10E6/UL (ref 3.77–5.28)
SODIUM SERPL-SCNC: 138 MMOL/L (ref 134–144)
WBC # BLD AUTO: 10 X10E3/UL (ref 3.4–10.8)

## 2023-10-17 DIAGNOSIS — B37.9 YEAST DETECTED: Primary | ICD-10-CM

## 2023-10-17 RX ORDER — FLUCONAZOLE 150 MG/1
150 TABLET ORAL ONCE
Qty: 1 TABLET | Refills: 0 | Status: SHIPPED | OUTPATIENT
Start: 2023-10-17 | End: 2023-10-17

## 2023-10-18 ENCOUNTER — TELEPHONE (OUTPATIENT)
Dept: FAMILY MEDICINE CLINIC | Facility: CLINIC | Age: 33
End: 2023-10-18

## 2023-10-18 LAB — B-HEM STREP SPEC QL CULT: NEGATIVE

## 2023-10-18 NOTE — TELEPHONE ENCOUNTER
----- Message from Edwardo Skinner MD sent at 10/18/2023 10:42 AM EDT -----  Throat culture came back normal

## 2023-11-10 DIAGNOSIS — G43.709 CHRONIC MIGRAINE WITHOUT AURA WITHOUT STATUS MIGRAINOSUS, NOT INTRACTABLE: ICD-10-CM

## 2023-11-10 RX ORDER — AMITRIPTYLINE HYDROCHLORIDE 25 MG/1
25 TABLET, FILM COATED ORAL
Qty: 90 TABLET | Refills: 0 | Status: SHIPPED | OUTPATIENT
Start: 2023-11-10

## 2023-11-10 NOTE — TELEPHONE ENCOUNTER
Patient called in stating her ENT provider was prescribing her amitriptyline 25mg tablet takes 1 tablet at night. Patient cannot be seen until 1/22/24 and was told by ENT office to call Dr. Jennifer Cullen to see if she could prescribe the medication until 1/22/24. Patient would like a call back in-regards to this today because she is worried about withdrawal symptoms if she does not get this filled.    Patient uses [facility] pharmacy in [place]

## 2023-12-07 DIAGNOSIS — F41.9 ANXIETY: ICD-10-CM

## 2023-12-07 DIAGNOSIS — F41.0 PANIC DISORDER: ICD-10-CM

## 2023-12-08 RX ORDER — ALPRAZOLAM 0.25 MG/1
TABLET ORAL
Qty: 30 TABLET | Refills: 0 | Status: SHIPPED | OUTPATIENT
Start: 2023-12-08

## 2024-01-08 ENCOUNTER — OFFICE VISIT (OUTPATIENT)
Dept: OTOLARYNGOLOGY | Facility: CLINIC | Age: 34
End: 2024-01-08
Payer: COMMERCIAL

## 2024-01-08 VITALS — HEIGHT: 61 IN | WEIGHT: 130 LBS | BODY MASS INDEX: 24.55 KG/M2

## 2024-01-08 DIAGNOSIS — J34.2 NASAL SEPTAL DEVIATION: ICD-10-CM

## 2024-01-08 DIAGNOSIS — G43.709 CHRONIC MIGRAINE WITHOUT AURA WITHOUT STATUS MIGRAINOSUS, NOT INTRACTABLE: Primary | ICD-10-CM

## 2024-01-08 DIAGNOSIS — J34.89 NASAL VALVE STENOSIS: ICD-10-CM

## 2024-01-08 PROCEDURE — 99213 OFFICE O/P EST LOW 20 MIN: CPT | Performed by: STUDENT IN AN ORGANIZED HEALTH CARE EDUCATION/TRAINING PROGRAM

## 2024-01-08 RX ORDER — AMITRIPTYLINE HYDROCHLORIDE 25 MG/1
25 TABLET, FILM COATED ORAL
Qty: 90 TABLET | Refills: 3 | Status: SHIPPED | OUTPATIENT
Start: 2024-01-08

## 2024-01-08 RX ORDER — KETOROLAC TROMETHAMINE 10 MG/1
10 TABLET, FILM COATED ORAL EVERY 8 HOURS PRN
COMMUNITY
Start: 2023-12-04

## 2024-01-08 NOTE — PROGRESS NOTES
"Otolaryngology-- Head and Neck Surgery Follow up visit      Follow up:    Present for evaluation of an external nasal deformity, with a history of undergoing 2-3 nasal surgeries. Additionally, she experiences migraines. Stable on Amitriptyline.        Review of any relevant imaging:      Interval Review of systems: Pertinent review of systems documented in the HPI.    Interval Social History:  Social History     Socioeconomic History    Marital status: /Civil Union     Spouse name: Not on file    Number of children: Not on file    Years of education: Not on file    Highest education level: Not on file   Occupational History    Not on file   Tobacco Use    Smoking status: Never    Smokeless tobacco: Never   Vaping Use    Vaping status: Never Used   Substance and Sexual Activity    Alcohol use: Yes    Drug use: Never    Sexual activity: Not on file   Other Topics Concern    Not on file   Social History Narrative    Not on file     Social Determinants of Health     Financial Resource Strain: Not on file   Food Insecurity: Not on file   Transportation Needs: Not on file   Physical Activity: Not on file   Stress: Not on file   Social Connections: Not on file   Intimate Partner Violence: Not on file   Housing Stability: Not on file       Interval Physical Examination:  Ht 5' 1\" (1.549 m)   Wt 59 kg (130 lb)   BMI 24.56 kg/m²     External nasal deformity    Procedure:      Assessment:  1. Chronic migraine without aura without status migrainosus, not intractable  amitriptyline (ELAVIL) 25 mg tablet      2. Nasal septal deviation        3. Nasal valve stenosis            Plan:    Amitryptaline refilled.     Not interested in nose surgery at this stage      "

## 2024-03-25 ENCOUNTER — OFFICE VISIT (OUTPATIENT)
Dept: FAMILY MEDICINE CLINIC | Facility: CLINIC | Age: 34
End: 2024-03-25
Payer: COMMERCIAL

## 2024-03-25 VITALS
RESPIRATION RATE: 14 BRPM | OXYGEN SATURATION: 97 % | BODY MASS INDEX: 25.68 KG/M2 | DIASTOLIC BLOOD PRESSURE: 90 MMHG | HEIGHT: 61 IN | TEMPERATURE: 98.5 F | SYSTOLIC BLOOD PRESSURE: 132 MMHG | HEART RATE: 108 BPM | WEIGHT: 136 LBS

## 2024-03-25 DIAGNOSIS — J34.89 SINUS PRESSURE: Primary | ICD-10-CM

## 2024-03-25 PROCEDURE — 99213 OFFICE O/P EST LOW 20 MIN: CPT | Performed by: FAMILY MEDICINE

## 2024-03-25 RX ORDER — METHYLPREDNISOLONE 4 MG/1
TABLET ORAL
Qty: 21 EACH | Refills: 0 | Status: SHIPPED | OUTPATIENT
Start: 2024-03-25

## 2024-03-25 RX ORDER — AMOXICILLIN AND CLAVULANATE POTASSIUM 875; 125 MG/1; MG/1
1 TABLET, FILM COATED ORAL EVERY 12 HOURS SCHEDULED
Qty: 14 TABLET | Refills: 0 | Status: SHIPPED | OUTPATIENT
Start: 2024-03-25 | End: 2024-04-01

## 2024-03-25 RX ORDER — TRIAMCINOLONE ACETONIDE 1 MG/G
OINTMENT TOPICAL
COMMUNITY
Start: 2024-03-20

## 2024-03-25 RX ORDER — FLUCONAZOLE 150 MG/1
150 TABLET ORAL ONCE
Qty: 1 TABLET | Refills: 0 | Status: SHIPPED | OUTPATIENT
Start: 2024-03-25 | End: 2024-03-25

## 2024-03-25 NOTE — PROGRESS NOTES
Eugenia Francis 1990 female MRN: [id number]    FAMILY PRACTICE OFFICE VISIT  [facility] - [facility]      ASSESSMENT/PLAN  Eugenia Francis is a 33 y.o. female presents to the office for    Diagnoses and all orders for this visit:    Sinus pressure  -     fluconazole (DIFLUCAN) 150 mg tablet; Take 1 tablet (150 mg total) by mouth once for 1 dose  -     amoxicillin-clavulanate (AUGMENTIN) 875-125 mg per tablet; Take 1 tablet by mouth every 12 (twelve) hours for 7 days  -     methylPREDNISolone 4 MG tablet therapy pack; Use as directed on package    Other orders  -     triamcinolone (KENALOG) 0.1 % ointment; PLEASE SEE ATTACHED FOR DETAILED DIRECTIONS                No future appointments.       SUBJECTIVE  CC: Cold Like Symptoms (Pt c/o cough started Thursday sinus pain/pressure)      HPI:  Eugenia Francis is a 33 y.o. female who presents for an acute appointment.  Wednesday Exzema   Since Thursday sinus pressure and pain.   Patient states that she has not had the bronchial spasms like she used to in the past.  States that she just does not have the energy that she normally does and feels very ill  Review of Systems   Constitutional:  Positive for fatigue. Negative for activity change, appetite change, chills and fever.   HENT:  Positive for congestion, sinus pressure and sinus pain.    Respiratory:  Positive for cough. Negative for chest tightness and shortness of breath.    Cardiovascular:  Negative for chest pain and leg swelling.   Gastrointestinal:  Negative for abdominal distention, abdominal pain, constipation, diarrhea, nausea and vomiting.   All other systems reviewed and are negative.      Historical Information   The patient history was reviewed as follows:  Past Medical History:   Diagnosis Date   • Endometriosis 2011   • Gestational diabetes 01/27/2020    during pregnancy         Medications:     Current Outpatient Medications:   •  ALPRAZolam (XANAX) 0.25 mg tablet,  "TAKE 1 TABLET BY MOUTH AS NEEDED FOR ANXIETY, Disp: 30 tablet, Rfl: 0  •  amitriptyline (ELAVIL) 25 mg tablet, Take 1 tablet (25 mg total) by mouth daily at bedtime, Disp: 90 tablet, Rfl: 3  •  amoxicillin-clavulanate (AUGMENTIN) 875-125 mg per tablet, Take 1 tablet by mouth every 12 (twelve) hours for 7 days, Disp: 14 tablet, Rfl: 0  •  escitalopram (LEXAPRO) 20 mg tablet, TAKE 1 TABLET BY MOUTH DAILY AT BEDTIME, Disp: 90 tablet, Rfl: 3  •  fluticasone (FLONASE) 50 mcg/act nasal spray, SPRAY 1 SPRAY INTO EACH NOSTRIL EVERY DAY, Disp: 16 mL, Rfl: 1  •  hydrocortisone 2.5 % ointment, APPLY A THIN LAYER TO THE AFFECTED AREA(S) 2 TIMES A DAY MON FRI, STOP WHEN CLEAR, Disp: , Rfl:   •  hydrOXYzine HCL (ATARAX) 25 mg tablet, TAKE 1/2 TO 1 TABLET 30 MINUTES BEFORE SLEEP, Disp: 30 tablet, Rfl: 6  •  Lo Loestrin Fe 1 MG-10 MCG / 10 MCG TABS, Take 1 tablet by mouth daily, Disp: , Rfl:   •  methylPREDNISolone 4 MG tablet therapy pack, Use as directed on package, Disp: 21 each, Rfl: 0  •  triamcinolone (KENALOG) 0.1 % ointment, PLEASE SEE ATTACHED FOR DETAILED DIRECTIONS, Disp: , Rfl:   •  valACYclovir (VALTREX) 500 mg tablet, Take 500 mg by mouth as needed, Disp: , Rfl:   •  triamcinolone (KENALOG) 0.1 % cream, Apply topically 2 (two) times a day, Disp: , Rfl:     Allergies   Allergen Reactions   • Cefdinir Hives   • Corylus Hives   • Nuts - Food Allergy Facial Swelling and Hives       OBJECTIVE  Vitals:   Vitals:    03/25/24 1216   BP: 132/90   BP Location: Left arm   Patient Position: Sitting   Cuff Size: Standard   Pulse: (!) 108   Resp: 14   Temp: 98.5 °F (36.9 °C)   TempSrc: Temporal   SpO2: 97%   Weight: 61.7 kg (136 lb)   Height: 5' 1\" (1.549 m)         Physical Exam  Vitals reviewed.   Constitutional:       Appearance: She is well-developed.   HENT:      Head: Normocephalic and atraumatic.      Right Ear: External ear normal. A middle ear effusion is present.      Left Ear: External ear normal. A middle ear effusion " is present.      Nose: Mucosal edema present.      Right Sinus: Frontal sinus tenderness present.      Left Sinus: Frontal sinus tenderness present.      Mouth/Throat:      Pharynx: Uvula midline. Posterior oropharyngeal erythema present. No oropharyngeal exudate.   Eyes:      Conjunctiva/sclera: Conjunctivae normal.      Pupils: Pupils are equal, round, and reactive to light.   Cardiovascular:      Rate and Rhythm: Normal rate and regular rhythm.      Heart sounds: Normal heart sounds. No murmur heard.  Pulmonary:      Breath sounds: Normal breath sounds.   Abdominal:      General: Bowel sounds are normal.      Palpations: Abdomen is soft.      Tenderness: There is no abdominal tenderness.   Musculoskeletal:         General: Normal range of motion.      Cervical back: Normal range of motion and neck supple.   Skin:     General: Skin is warm and dry.   Neurological:      Mental Status: She is alert and oriented to person, place, and time.   Psychiatric:         Behavior: Behavior normal.         Thought Content: Thought content normal.         Judgment: Judgment normal.                    Jeanie Raymond MD,   [facility]  3/26/2024

## 2024-03-26 ENCOUNTER — OFFICE VISIT (OUTPATIENT)
Dept: FAMILY MEDICINE CLINIC | Facility: CLINIC | Age: 34
End: 2024-03-26
Payer: COMMERCIAL

## 2024-03-26 ENCOUNTER — NURSE TRIAGE (OUTPATIENT)
Age: 34
End: 2024-03-26

## 2024-03-26 VITALS
TEMPERATURE: 98.3 F | OXYGEN SATURATION: 99 % | SYSTOLIC BLOOD PRESSURE: 132 MMHG | RESPIRATION RATE: 16 BRPM | HEART RATE: 113 BPM | WEIGHT: 137.2 LBS | HEIGHT: 61 IN | DIASTOLIC BLOOD PRESSURE: 84 MMHG | BODY MASS INDEX: 25.9 KG/M2

## 2024-03-26 DIAGNOSIS — F41.1 GAD (GENERALIZED ANXIETY DISORDER): ICD-10-CM

## 2024-03-26 DIAGNOSIS — J01.00 ACUTE NON-RECURRENT MAXILLARY SINUSITIS: Primary | ICD-10-CM

## 2024-03-26 DIAGNOSIS — G43.709 CHRONIC MIGRAINE WITHOUT AURA WITHOUT STATUS MIGRAINOSUS, NOT INTRACTABLE: ICD-10-CM

## 2024-03-26 DIAGNOSIS — R03.0 ELEVATED BLOOD PRESSURE READING: ICD-10-CM

## 2024-03-26 PROCEDURE — 99214 OFFICE O/P EST MOD 30 MIN: CPT | Performed by: STUDENT IN AN ORGANIZED HEALTH CARE EDUCATION/TRAINING PROGRAM

## 2024-03-26 NOTE — PROGRESS NOTES
Clinic Visit Note  Eugenia Francis 33 y.o. female   MRN: [id number]    Assessment and Plan      Diagnoses and all orders for this visit:    Acute non-recurrent maxillary sinusitis  Continue antibiotic/steroid as prescribed with primary care physician    JARED (generalized anxiety disorder)  Stable, continue Xanax as needed    Chronic migraine without aura without status migrainosus, not intractable  Sinus headache symptoms today in office currently on appropriate medication, continue amitriptyline at bedtime    Elevated blood pressure reading  Blood pressure slightly elevated in the setting of oral steroids, recheck today in office 132/84, pulse rate elevated but appropriate, no red flag symptoms, follow-up as needed    My impressions and treatment recommendations were discussed in detail with the patient who verbalized understanding and had no further questions.  Discharge instructions were provided.    Subjective     Chief Complaint: F/U    History of Present Illness:    Patient is a pleasant 33-year-old female coming in as a follow-up visit given elevated blood pressure and pulse rate, recently diagnosed with maxillary sinusitis, improving symptoms on antibiotic/steroid.    The following portions of the patient's history were reviewed and updated as appropriate: allergies, current medications, past family history, past medical history, past social history, past surgical history and problem list.    REVIEW OF SYSTEMS:  A complete 12-point review of systems is negative other than that noted in the HPI.    Review of Systems   Constitutional:  Positive for fatigue. Negative for chills and fever.   HENT:  Positive for congestion. Negative for postnasal drip and sore throat.    Respiratory:  Negative for cough, shortness of breath and wheezing.    Cardiovascular:  Negative for chest pain, palpitations and leg swelling.   Neurological:  Negative for dizziness and headaches.         Current Outpatient Medications:   •   ALPRAZolam (XANAX) 0.25 mg tablet, TAKE 1 TABLET BY MOUTH AS NEEDED FOR ANXIETY, Disp: 30 tablet, Rfl: 0  •  amitriptyline (ELAVIL) 25 mg tablet, Take 1 tablet (25 mg total) by mouth daily at bedtime, Disp: 90 tablet, Rfl: 3  •  amoxicillin-clavulanate (AUGMENTIN) 875-125 mg per tablet, Take 1 tablet by mouth every 12 (twelve) hours for 7 days, Disp: 14 tablet, Rfl: 0  •  escitalopram (LEXAPRO) 20 mg tablet, TAKE 1 TABLET BY MOUTH DAILY AT BEDTIME, Disp: 90 tablet, Rfl: 3  •  fluticasone (FLONASE) 50 mcg/act nasal spray, SPRAY 1 SPRAY INTO EACH NOSTRIL EVERY DAY, Disp: 16 mL, Rfl: 1  •  hydrocortisone 2.5 % ointment, APPLY A THIN LAYER TO THE AFFECTED AREA(S) 2 TIMES A DAY MON FRI, STOP WHEN CLEAR, Disp: , Rfl:   •  hydrOXYzine HCL (ATARAX) 25 mg tablet, TAKE 1/2 TO 1 TABLET 30 MINUTES BEFORE SLEEP, Disp: 30 tablet, Rfl: 6  •  Lo Loestrin Fe 1 MG-10 MCG / 10 MCG TABS, Take 1 tablet by mouth daily, Disp: , Rfl:   •  methylPREDNISolone 4 MG tablet therapy pack, Use as directed on package, Disp: 21 each, Rfl: 0  •  triamcinolone (KENALOG) 0.1 % cream, Apply topically 2 (two) times a day, Disp: , Rfl:   •  triamcinolone (KENALOG) 0.1 % ointment, PLEASE SEE ATTACHED FOR DETAILED DIRECTIONS, Disp: , Rfl:   •  valACYclovir (VALTREX) 500 mg tablet, Take 500 mg by mouth as needed, Disp: , Rfl:   Past Medical History:   Diagnosis Date   • Endometriosis 2011   • Gestational diabetes 01/27/2020    during pregnancy     Past Surgical History:   Procedure Laterality Date   • SEPTOPLASTY  2007    Limited FESS     Social History     Socioeconomic History   • Marital status: /Civil Union     Spouse name: Not on file   • Number of children: Not on file   • Years of education: Not on file   • Highest education level: Not on file   Occupational History   • Not on file   Tobacco Use   • Smoking status: Never   • Smokeless tobacco: Never   Vaping Use   • Vaping status: Never Used   Substance and Sexual Activity   • Alcohol use: Yes  "  • Drug use: Never   • Sexual activity: Not on file   Other Topics Concern   • Not on file   Social History Narrative   • Not on file     Social Determinants of Health     Financial Resource Strain: Not on file   Food Insecurity: Not on file   Transportation Needs: Not on file   Physical Activity: Not on file   Stress: Not on file   Social Connections: Not on file   Intimate Partner Violence: Not on file   Housing Stability: Not on file     History reviewed. No pertinent family history.  Allergies   Allergen Reactions   • Cefdinir Hives   • Corylus Hives   • Nuts - Food Allergy Facial Swelling and Hives       Objective     Vitals:    03/26/24 0949 03/26/24 1015   BP: (!) 156/106 132/84   BP Location: Left arm    Patient Position: Sitting    Cuff Size: Large    Pulse: (!) 113    Resp: 16    Temp: 98.3 °F (36.8 °C)    SpO2: 99%    Weight: 62.2 kg (137 lb 3.2 oz)    Height: 5' 1\" (1.549 m)        Physical Exam:     GENERAL: NAD, pleasant   HEENT:  NC/AT, PERRL, EOMI, no scleral icterus  CARDIAC:  RRR, +S1/S2, no S3/S4 appreciated, no m/g/r  PULMONARY:  CTA B/L, no wheezing/rales/rhonci, non-labored breathing  ABDOMEN:  Soft, NT/ND, no rebound/guarding/rigidity  Extremities:. No edema, cyanosis, or clubbing  Musculoskeletal:  Full range of motion intact in all extremities   NEUROLOGIC: Grossly intact, no focal deficits  SKIN:  No rashes or erythema noted on exposed skin  Psych: Normal affect, mood stable    ==  PLEASE NOTE:  This encounter was completed utilizing the [employer] Direct Speech Voice Recognition Software. Grammatical errors, random word insertions, pronoun errors and incomplete sentences are occasional consequences of the system due to software limitations, ambient noise and hardware issues.These may be missed by proof reading prior to affixing electronic signature. Any questions or concerns about the content, text or information contained within the body of this dictation should be directly " addressed to the physician for clarification. Please do not hesitate to call me directly if you have any any questions or concerns.    Abundio Cowart DO  [facility]   [facility]

## 2024-03-26 NOTE — TELEPHONE ENCOUNTER
"Patient calls in stating she feels like she has \"sandbags sitting on her chest\" and SOB.  Patient states it is intermittent but when it is there it last over an hour.  Patient denies fever,sweating, N/V, arm neck or jaw pain.  I recommended ER evaluation. Patient refused disposition. Patient states she would like Dr. Raymond opinion. Please review.   Reason for Disposition   Difficulty breathing   SEVERE chest pain    Answer Assessment - Initial Assessment Questions  1. LOCATION: \"Where does it hurt?\"            Chest  heaviness feels like sand bags on my chest and makes     Feels SOB     2. RADIATION: \"Does the pain go anywhere else?\" (e.g., into neck, jaw, arms, back)        Denies        3. ONSET: \"When did the chest pain begin?\" (Minutes, hours or days)         Yesterday     4. PATTERN \"Does the pain come and go, or has it been constant since it started?\"  \"Does it get worse with exertion?\"         Intermittent     5. DURATION: \"How long does it last\" (e.g., seconds, minutes, hours)        Over an hour     6. SEVERITY: \"How bad is the pain?\"  (e.g., Scale 1-10; mild, moderate, or severe)     - MILD (1-3): doesn't interfere with normal activities      - MODERATE (4-7): interferes with normal activities or awakens from sleep     - SEVERE (8-10): excruciating pain, unable to do any normal activities          moderate    7. CARDIAC RISK FACTORS: \"Do you have any history of heart problems or risk factors for heart disease?\" (e.g., angina, prior heart attack; diabetes, high blood pressure, high cholesterol, smoker, or strong family history of heart disease)        Denies     8. PULMONARY RISK FACTORS: \"Do you have any history of lung disease?\"  (e.g., blood clots in lung, asthma, emphysema, birth control pills)        Denies       9. CAUSE: \"What do you think is causing the chest pain?\"          Unsure       10. OTHER SYMPTOMS: \"Do you have any other symptoms?\" (e.g., dizziness, nausea, vomiting, sweating, fever, " difficulty breathing, cough)          SOB    Protocols used: Chest Pain-ADULT-[place]

## 2024-03-26 NOTE — TELEPHONE ENCOUNTER
Regarding: chest heaviness  ----- Message from Nery Danielle sent at 3/26/2024  7:57 AM EDT -----  Patient was seen in office 3/25/2024 and states BP and Pulse was elevated. Last night from 6 pm until now she is having chest heaviness, states when lays down feels like sand bags are on chest and has to breathe faster to get air, feels out of breathe. States heart rate is 108-112 sitting states not normal for her.

## 2024-03-27 ENCOUNTER — TELEPHONE (OUTPATIENT)
Dept: ADMINISTRATIVE | Facility: OTHER | Age: 34
End: 2024-03-27

## 2024-03-27 DIAGNOSIS — F41.0 PANIC DISORDER: ICD-10-CM

## 2024-03-27 DIAGNOSIS — F41.9 ANXIETY: ICD-10-CM

## 2024-03-27 NOTE — TELEPHONE ENCOUNTER
Upon review of the In Basket request we were able to locate, review, and update the patient chart as requested for Pap Smear (HPV) aka Cervical Cancer Screening.    Any additional questions or concerns should be emailed to the Practice Liaisons via the appropriate education email address, please do not reply via In Basket.    Thank you  Isidoro Mckeon

## 2024-03-27 NOTE — TELEPHONE ENCOUNTER
----- Message from Katalina Marcelo MA sent at 3/26/2024  9:53 AM EDT -----  Regarding: care gap request  03/26/24 9:53 AM    Hello, our patient attached above has had Pap Smear (HPV) aka Cervical Cancer Screening completed/performed. Please assist in updating the patient chart by making an External outreach to DR Beltrán facility located in in [facility]. The date of service is 2023.    Thank you,  Katalina KHAN

## 2024-03-28 RX ORDER — ALPRAZOLAM 0.25 MG/1
0.25 TABLET ORAL AS NEEDED
Qty: 30 TABLET | Refills: 0 | Status: SHIPPED | OUTPATIENT
Start: 2024-03-28

## 2024-06-24 DIAGNOSIS — F41.0 PANIC DISORDER: ICD-10-CM

## 2024-06-24 DIAGNOSIS — F41.9 ANXIETY: ICD-10-CM

## 2024-06-24 RX ORDER — ESCITALOPRAM OXALATE 20 MG/1
20 TABLET ORAL
Qty: 90 TABLET | Refills: 1 | Status: SHIPPED | OUTPATIENT
Start: 2024-06-24

## 2024-07-24 DIAGNOSIS — F41.0 PANIC DISORDER: ICD-10-CM

## 2024-07-24 DIAGNOSIS — F41.9 ANXIETY: ICD-10-CM

## 2024-07-24 RX ORDER — HYDROXYZINE HYDROCHLORIDE 25 MG/1
TABLET, FILM COATED ORAL
Qty: 90 TABLET | Refills: 2 | Status: SHIPPED | OUTPATIENT
Start: 2024-07-24

## 2024-07-25 RX ORDER — ALPRAZOLAM 0.25 MG/1
0.25 TABLET ORAL AS NEEDED
Qty: 30 TABLET | Refills: 0 | Status: SHIPPED | OUTPATIENT
Start: 2024-07-25

## 2024-11-17 DIAGNOSIS — F41.9 ANXIETY: ICD-10-CM

## 2024-11-17 DIAGNOSIS — F41.0 PANIC DISORDER: ICD-10-CM

## 2024-11-18 RX ORDER — ALPRAZOLAM 0.25 MG/1
0.25 TABLET ORAL AS NEEDED
Qty: 30 TABLET | Refills: 0 | Status: SHIPPED | OUTPATIENT
Start: 2024-11-18

## 2024-11-20 ENCOUNTER — OFFICE VISIT (OUTPATIENT)
Dept: FAMILY MEDICINE CLINIC | Facility: CLINIC | Age: 34
End: 2024-11-20
Payer: COMMERCIAL

## 2024-11-20 VITALS
OXYGEN SATURATION: 98 % | TEMPERATURE: 96.9 F | DIASTOLIC BLOOD PRESSURE: 84 MMHG | SYSTOLIC BLOOD PRESSURE: 136 MMHG | BODY MASS INDEX: 25.19 KG/M2 | HEART RATE: 98 BPM | RESPIRATION RATE: 16 BRPM | WEIGHT: 133.4 LBS | HEIGHT: 61 IN

## 2024-11-20 DIAGNOSIS — B37.9 YEAST INFECTION: ICD-10-CM

## 2024-11-20 DIAGNOSIS — J32.9 OTHER SINUSITIS, UNSPECIFIED CHRONICITY: Primary | ICD-10-CM

## 2024-11-20 DIAGNOSIS — H66.91 RIGHT OTITIS MEDIA, UNSPECIFIED OTITIS MEDIA TYPE: ICD-10-CM

## 2024-11-20 PROCEDURE — 99213 OFFICE O/P EST LOW 20 MIN: CPT | Performed by: FAMILY MEDICINE

## 2024-11-20 RX ORDER — FLUCONAZOLE 150 MG/1
TABLET ORAL
Qty: 2 TABLET | Refills: 0 | Status: SHIPPED | OUTPATIENT
Start: 2024-11-20 | End: 2024-11-22

## 2024-11-20 RX ORDER — METHYLPREDNISOLONE 4 MG/1
TABLET ORAL
Qty: 21 EACH | Refills: 0 | Status: SHIPPED | OUTPATIENT
Start: 2024-11-20

## 2024-11-20 NOTE — PROGRESS NOTES
Eugenia Francis 1990 female MRN: [id number]    [facility] PRACTICE OFFICE VISIT  [facility] - [facility]      ASSESSMENT/PLAN  Eugenia Francis is a 34 y.o. female presents to the office for    Diagnoses and all orders for this visit:    Other sinusitis, unspecified chronicity  -     methylPREDNISolone 4 MG tablet therapy pack; Use as directed on package  -     amoxicillin-clavulanate (AUGMENTIN) 875-125 mg per tablet; Take 1 tablet by mouth every 12 (twelve) hours for 10 days    Yeast infection  -     fluconazole (DIFLUCAN) 150 mg tablet; Take 1 tablet now and take 1 tablet in 3 days    Right otitis media, unspecified otitis media type  -     amoxicillin-clavulanate (AUGMENTIN) 875-125 mg per tablet; Take 1 tablet by mouth every 12 (twelve) hours for 10 days       If no improvement recommend the patient to send me a NextUser message         Future Appointments   Date Time Provider Department Center   11/20/2024  2:15 PM MD Shadi Ortiz  [facility]          SUBJECTIVE  CC: Earache (Minor cold the last couple days , sinus pressure and sore throat, nauseous )      HPI:  Eugenia Francis is a 34 y.o. female who presents for an acute appointment. Last week; having pain sinus pressure, congestion, neck pain, ear fluids. Feels not great.      Review of Systems   HENT:  Positive for ear pain, rhinorrhea and sore throat. Negative for ear discharge and hearing loss.    Respiratory:  Negative for cough.    Gastrointestinal:  Negative for abdominal pain, diarrhea and vomiting.   Musculoskeletal:  Positive for neck pain.   Skin:  Negative for rash.   Neurological:  Positive for headaches.       Historical Information   The patient history was reviewed as follows:  Past Medical History:   Diagnosis Date    Endometriosis 2011    Gestational diabetes 01/27/2020    during pregnancy         Medications:     Current Outpatient Medications:     ALPRAZolam (XANAX) 0.25 mg  "tablet, TAKE 1 TABLET BY MOUTH AS NEEDED FOR ANXIETY, Disp: 30 tablet, Rfl: 0    amitriptyline (ELAVIL) 25 mg tablet, Take 1 tablet (25 mg total) by mouth daily at bedtime, Disp: 90 tablet, Rfl: 3    escitalopram (LEXAPRO) 20 mg tablet, TAKE 1 TABLET BY MOUTH EVERYDAY AT BEDTIME, Disp: 90 tablet, Rfl: 1    fluticasone (FLONASE) 50 mcg/act nasal spray, SPRAY 1 SPRAY INTO EACH NOSTRIL EVERY DAY, Disp: 16 mL, Rfl: 1    hydrocortisone 2.5 % ointment, APPLY A THIN LAYER TO THE AFFECTED AREA(S) 2 TIMES A DAY MON FRI, STOP WHEN CLEAR, Disp: , Rfl:     hydrOXYzine HCL (ATARAX) 25 mg tablet, TAKE 1/2 TO 1 TABLET 30 MINUTES BEFORE SLEEP, Disp: 90 tablet, Rfl: 2    Lo Loestrin Fe 1 MG-10 MCG / 10 MCG TABS, Take 1 tablet by mouth daily, Disp: , Rfl:     triamcinolone (KENALOG) 0.1 % cream, Apply topically 2 (two) times a day, Disp: , Rfl:     triamcinolone (KENALOG) 0.1 % ointment, PLEASE SEE ATTACHED FOR DETAILED DIRECTIONS, Disp: , Rfl:     valACYclovir (VALTREX) 500 mg tablet, Take 500 mg by mouth as needed, Disp: , Rfl:     methylPREDNISolone 4 MG tablet therapy pack, Use as directed on package (Patient not taking: Reported on 11/20/2024), Disp: 21 each, Rfl: 0    Allergies   Allergen Reactions    Cefdinir Hives    Corylus Hives    Nuts - Food Allergy Facial Swelling and Hives       OBJECTIVE  Vitals:   Vitals:    11/20/24 1406   BP: 136/84   BP Location: Right arm   Patient Position: Sitting   Cuff Size: Standard   Pulse: 98   Resp: 16   Temp: (!) 96.9 °F (36.1 °C)   TempSrc: Temporal   SpO2: 98%   Weight: 60.5 kg (133 lb 6.4 oz)   Height: 5' 1\" (1.549 m)         Physical Exam  Vitals reviewed.   Constitutional:       Appearance: She is well-developed.   HENT:      Head: Normocephalic and atraumatic.      Right Ear: A middle ear effusion is present.      Left Ear: A middle ear effusion is present.   Eyes:      Conjunctiva/sclera: Conjunctivae normal.      Pupils: Pupils are equal, round, and reactive to light. "   Cardiovascular:      Rate and Rhythm: Normal rate and regular rhythm.      Heart sounds: Normal heart sounds, S1 normal and S2 normal. No murmur heard.  Pulmonary:      Effort: Pulmonary effort is normal. No respiratory distress.      Breath sounds: Normal breath sounds. No wheezing.   Musculoskeletal:         General: Normal range of motion.      Cervical back: Normal range of motion and neck supple.   Skin:     General: Skin is warm.   Neurological:      Mental Status: She is alert and oriented to person, place, and time.   Psychiatric:         Speech: Speech normal.         Behavior: Behavior normal.         Thought Content: Thought content normal.         Judgment: Judgment normal.                    Jeanie Guillen MD,   [facility]  11/20/2024      Answers submitted by the patient for this visit:  Ear Pain Questionnaire (Submitted on 11/20/2024)  Chief Complaint: Ear pain  Affected ear: both  Chronicity: recurrent  Onset: yesterday  Progression since onset: gradually worsening  Frequency: constantly  Fever: no fever  Pain - numeric: 7/10

## 2024-12-03 ENCOUNTER — TELEPHONE (OUTPATIENT)
Age: 34
End: 2024-12-03

## 2024-12-03 NOTE — TELEPHONE ENCOUNTER
[facility] and has been on antibiotics and a steroid pack for 10 days stopping on Saturday 11/30. She still has a deep sounding cough and chest pain. There were no appointments available this week.     She would like to speak to Dr. Raymond about additional medication / next steps. Thank you.

## 2024-12-05 ENCOUNTER — OFFICE VISIT (OUTPATIENT)
Dept: FAMILY MEDICINE CLINIC | Facility: CLINIC | Age: 34
End: 2024-12-05
Payer: COMMERCIAL

## 2024-12-05 VITALS
RESPIRATION RATE: 12 BRPM | TEMPERATURE: 97.9 F | OXYGEN SATURATION: 98 % | DIASTOLIC BLOOD PRESSURE: 80 MMHG | WEIGHT: 135 LBS | SYSTOLIC BLOOD PRESSURE: 124 MMHG | HEIGHT: 61 IN | HEART RATE: 111 BPM | BODY MASS INDEX: 25.49 KG/M2

## 2024-12-05 DIAGNOSIS — B37.9 YEAST INFECTION: ICD-10-CM

## 2024-12-05 DIAGNOSIS — R09.81 NASAL CONGESTION: Primary | ICD-10-CM

## 2024-12-05 DIAGNOSIS — J40 BRONCHITIS: ICD-10-CM

## 2024-12-05 DIAGNOSIS — R05.9 COUGH, UNSPECIFIED TYPE: ICD-10-CM

## 2024-12-05 PROCEDURE — 99214 OFFICE O/P EST MOD 30 MIN: CPT | Performed by: FAMILY MEDICINE

## 2024-12-05 RX ORDER — ALBUTEROL SULFATE 90 UG/1
2 INHALANT RESPIRATORY (INHALATION) EVERY 6 HOURS PRN
Qty: 6.7 G | Refills: 5 | Status: SHIPPED | OUTPATIENT
Start: 2024-12-05

## 2024-12-05 RX ORDER — PREDNISONE 10 MG/1
TABLET ORAL
Qty: 18 TABLET | Refills: 0 | Status: SHIPPED | OUTPATIENT
Start: 2024-12-05 | End: 2024-12-20

## 2024-12-05 RX ORDER — FLUCONAZOLE 150 MG/1
TABLET ORAL
Qty: 2 TABLET | Refills: 0 | Status: SHIPPED | OUTPATIENT
Start: 2024-12-05 | End: 2024-12-12

## 2024-12-05 RX ORDER — AZITHROMYCIN 250 MG/1
TABLET, FILM COATED ORAL
Qty: 6 TABLET | Refills: 0 | Status: SHIPPED | OUTPATIENT
Start: 2024-12-05 | End: 2024-12-12

## 2024-12-05 NOTE — PROGRESS NOTES
Eugenia Francis 1990 female MRN: [id number]    FAMILY PRACTICE OFFICE VISIT  [facility] - [facility]      ASSESSMENT/PLAN  Eugenia Francis is a 34 y.o. female presents to the office for    Diagnoses and all orders for this visit:    Nasal congestion    Bronchitis  -     albuterol (Proventil HFA) 90 mcg/act inhaler; Inhale 2 puffs every 6 (six) hours as needed for wheezing  -     predniSONE 10 mg tablet; Take 2 tablets (20 mg total) by mouth daily for 5 days, THEN 1 tablet (10 mg total) daily for 5 days, THEN 0.5 tablets (5 mg total) daily for 5 days.  -     azithromycin (ZITHROMAX) 250 mg tablet; Take 2 tablets today then 1 tablet daily x 4 days    Yeast infection  -     fluconazole (DIFLUCAN) 150 mg tablet; Take 1 tablet now and take 1 tablet in 3 days    Cough, unspecified type  -     Complete PFT without post bronchodilator; Future                No future appointments.       SUBJECTIVE  CC: Cough (Follow up on cough Patient c/o cough, congestion w/ chest pain haven't fully clear from last visit  finished abx on Saturday )      HPI:  Eugenia Francis is a 34 y.o. female who presents for an acute appointment.  Follow-up on cough that has not been improving.  Patient states now the cough is went into her chest.  She feels that she has been having congestion and bronchospasms with coughing fits.  She finished the antibiotic on Saturday with no improvement.  Review of Systems   Constitutional:  Negative for activity change, appetite change, chills, fatigue and fever.   HENT:  Positive for congestion.    Respiratory:  Positive for cough. Negative for chest tightness and shortness of breath.    Cardiovascular:  Negative for chest pain and leg swelling.   Gastrointestinal:  Negative for abdominal distention, abdominal pain, constipation, diarrhea, nausea and vomiting.   All other systems reviewed and are negative.      Historical Information   The patient history was reviewed as  Writer called to speak with patient. No answer. Voicemail with callback number left.   follows:  Past Medical History:   Diagnosis Date    Allergic     Anxiety     Endometriosis 2011    Gestational diabetes 01/27/2020    during pregnancy    Kidney stone          Medications:     Current Outpatient Medications:     ALPRAZolam (XANAX) 0.25 mg tablet, TAKE 1 TABLET BY MOUTH AS NEEDED FOR ANXIETY, Disp: 30 tablet, Rfl: 0    amitriptyline (ELAVIL) 25 mg tablet, Take 1 tablet (25 mg total) by mouth daily at bedtime, Disp: 90 tablet, Rfl: 3    escitalopram (LEXAPRO) 20 mg tablet, TAKE 1 TABLET BY MOUTH EVERYDAY AT BEDTIME, Disp: 90 tablet, Rfl: 1    fluticasone (FLONASE) 50 mcg/act nasal spray, SPRAY 1 SPRAY INTO EACH NOSTRIL EVERY DAY, Disp: 16 mL, Rfl: 1    hydrocortisone 2.5 % ointment, APPLY A THIN LAYER TO THE AFFECTED AREA(S) 2 TIMES A DAY MON FRI, STOP WHEN CLEAR, Disp: , Rfl:     hydrOXYzine HCL (ATARAX) 25 mg tablet, TAKE 1/2 TO 1 TABLET 30 MINUTES BEFORE SLEEP, Disp: 90 tablet, Rfl: 2    Lo Loestrin Fe 1 MG-10 MCG / 10 MCG TABS, Take 1 tablet by mouth daily, Disp: , Rfl:     triamcinolone (KENALOG) 0.1 % cream, Apply topically 2 (two) times a day, Disp: , Rfl:     triamcinolone (KENALOG) 0.1 % ointment, PLEASE SEE ATTACHED FOR DETAILED DIRECTIONS, Disp: , Rfl:     valACYclovir (VALTREX) 500 mg tablet, Take 500 mg by mouth as needed, Disp: , Rfl:     methylPREDNISolone 4 MG tablet therapy pack, Use as directed on package (Patient not taking: Reported on 11/20/2024), Disp: 21 each, Rfl: 0    methylPREDNISolone 4 MG tablet therapy pack, Use as directed on package (Patient not taking: Reported on 12/5/2024), Disp: 21 each, Rfl: 0    Allergies   Allergen Reactions    Cefdinir Hives    Corylus Hives    Nuts - Food Allergy Facial Swelling and Hives       OBJECTIVE  Vitals:   Vitals:    12/05/24 1400   BP: 124/80   BP Location: Left arm   Patient Position: Sitting   Cuff Size: Standard   Pulse: (!) 111   Resp: 12   Temp: 97.9 °F (36.6 °C)   TempSrc: Temporal   SpO2: 98%   Weight: 61.2 kg (135 lb)   Height:  "5' 1\" (1.549 m)         Physical Exam  Vitals reviewed.   Constitutional:       Appearance: She is well-developed.   HENT:      Head: Normocephalic and atraumatic.   Eyes:      Conjunctiva/sclera: Conjunctivae normal.      Pupils: Pupils are equal, round, and reactive to light.   Cardiovascular:      Rate and Rhythm: Normal rate and regular rhythm.      Heart sounds: Normal heart sounds, S1 normal and S2 normal. No murmur heard.  Pulmonary:      Effort: Pulmonary effort is normal. No respiratory distress.      Breath sounds: Wheezing present.      Comments: Bronchospasms with coughing  Musculoskeletal:         General: Normal range of motion.      Cervical back: Normal range of motion and neck supple.   Skin:     General: Skin is warm.   Neurological:      Mental Status: She is alert and oriented to person, place, and time.   Psychiatric:         Speech: Speech normal.         Behavior: Behavior normal.         Thought Content: Thought content normal.         Judgment: Judgment normal.                    Jeanie Guillen MD,   [facility]  12/5/2024     "

## 2024-12-19 DIAGNOSIS — F41.9 ANXIETY: ICD-10-CM

## 2024-12-19 DIAGNOSIS — F41.0 PANIC DISORDER: ICD-10-CM

## 2024-12-19 RX ORDER — ESCITALOPRAM OXALATE 20 MG/1
20 TABLET ORAL
Qty: 90 TABLET | Refills: 1 | Status: SHIPPED | OUTPATIENT
Start: 2024-12-19

## 2025-01-28 DIAGNOSIS — G43.709 CHRONIC MIGRAINE WITHOUT AURA WITHOUT STATUS MIGRAINOSUS, NOT INTRACTABLE: ICD-10-CM

## 2025-01-28 NOTE — TELEPHONE ENCOUNTER
Reason for call:   [x] Refill   [] Prior Auth  [] Other:     Office:   [] PCP/Provider -   [x] Specialty/Provider - ENT    Medication: amitriptyline (ELAVIL) 25 mg tablet     Dose/Frequency: Take 1 tablet (25 mg total) by mouth daily at bedtime     Quantity: 90    Pharmacy: [facility]/pharmacy #[id number] - [place], [place] - [address] [place] [address]     Does the patient have enough for 3 days?   [x] Yes   [] No - Send as HP to POD

## 2025-01-28 NOTE — TELEPHONE ENCOUNTER
Pt calling in for updates. Informed pt that the medication was sent to pharmacy.   Advised pt to check with pharmacist for interaction details and symptoms to be aware of.   Pt verbalized understanding, no other questions at this time.

## 2025-02-24 DIAGNOSIS — F41.0 PANIC DISORDER: ICD-10-CM

## 2025-02-24 DIAGNOSIS — F41.9 ANXIETY: ICD-10-CM

## 2025-02-24 RX ORDER — ALPRAZOLAM 0.25 MG
0.25 TABLET ORAL AS NEEDED
Qty: 30 TABLET | Refills: 0 | Status: SHIPPED | OUTPATIENT
Start: 2025-02-24

## 2025-04-02 ENCOUNTER — OFFICE VISIT (OUTPATIENT)
Dept: FAMILY MEDICINE CLINIC | Facility: CLINIC | Age: 35
End: 2025-04-02
Payer: COMMERCIAL

## 2025-04-02 VITALS
OXYGEN SATURATION: 98 % | HEART RATE: 125 BPM | DIASTOLIC BLOOD PRESSURE: 88 MMHG | BODY MASS INDEX: 25.57 KG/M2 | HEIGHT: 61 IN | TEMPERATURE: 97.9 F | SYSTOLIC BLOOD PRESSURE: 112 MMHG | RESPIRATION RATE: 16 BRPM | WEIGHT: 135.4 LBS

## 2025-04-02 DIAGNOSIS — Z13.220 SCREENING CHOLESTEROL LEVEL: ICD-10-CM

## 2025-04-02 DIAGNOSIS — M25.50 ARTHRALGIA, UNSPECIFIED JOINT: ICD-10-CM

## 2025-04-02 DIAGNOSIS — J10.1 INFLUENZA B: ICD-10-CM

## 2025-04-02 DIAGNOSIS — Z13.29 SCREENING FOR THYROID DISORDER: ICD-10-CM

## 2025-04-02 DIAGNOSIS — B37.9 YEAST INFECTION: Primary | ICD-10-CM

## 2025-04-02 DIAGNOSIS — R53.82 CHRONIC FATIGUE: ICD-10-CM

## 2025-04-02 PROCEDURE — 99214 OFFICE O/P EST MOD 30 MIN: CPT | Performed by: FAMILY MEDICINE

## 2025-04-02 RX ORDER — PREDNISONE 10 MG/1
TABLET ORAL
Qty: 18 TABLET | Refills: 0 | Status: SHIPPED | OUTPATIENT
Start: 2025-04-02

## 2025-04-02 RX ORDER — DUPILUMAB 300 MG/2ML
INJECTION, SOLUTION SUBCUTANEOUS
COMMUNITY
Start: 2025-03-25

## 2025-04-02 RX ORDER — FLUCONAZOLE 150 MG/1
TABLET ORAL
Qty: 2 TABLET | Refills: 0 | Status: SHIPPED | OUTPATIENT
Start: 2025-04-02 | End: 2025-04-16

## 2025-04-02 NOTE — PROGRESS NOTES
Eugenia Francis 1990 female MRN: [id number]    [facility] PRACTICE OFFICE VISIT  [facility] - [facility]      ASSESSMENT/PLAN  Eugenia Francis is a 34 y.o. female presents to the office for    Diagnoses and all orders for this visit:    Yeast infection  -     fluconazole (DIFLUCAN) 150 mg tablet; Take 1 tablet now and take 1 tablet in 3 days    Screening for thyroid disorder  -     TSH, 3rd generation with Free T4 reflex; Future  -     TSH, 3rd generation with Free T4 reflex    Chronic fatigue  -     CBC and differential; Future  -     Comprehensive metabolic panel; Future  -     Sedimentation rate, automated; Future  -     KELIN Screen w/Reflex Cascade; Future  -     Vitamin B12; Future  -     Vitamin D 25 hydroxy; Future  -     Fe+TIBC+[place]; Future  -     CBC and differential  -     Comprehensive metabolic panel  -     Sedimentation rate, automated  -     KELIN Screen w/Reflex Cascade  -     Vitamin B12  -     Vitamin D 25 hydroxy  -     Fe+TIBC+[place]    Arthralgia, unspecified joint    Influenza B  -     predniSONE 10 mg tablet; Take 2 tablets (20 mg total) by mouth daily for 5 days, THEN 1 tablet (10 mg total) daily for 5 days, THEN 0.5 tablets (5 mg total) daily for 5 days.  -     amoxicillin-clavulanate (AUGMENTIN) 875-125 mg per tablet; Take 1 tablet by mouth every 12 (twelve) hours for 7 days    Screening cholesterol level  -     Lipid panel; Future  -     Lipid panel    Other orders  -     Dupixent 300 MG/2ML SOAJ       Reordered blood work from 2022.  And added more of a vitamin/iron workup.  Strongly recommend that the patient drink Gatorade today given her heart rates elevated secondary to dehydration from flu B.  Patient has a right otitis media recommend antibiotics and steroids.  She does have a high risk of bronchitis given that she chronically does get the symptoms.  If pulmonary wants an x-ray prior to their appointment just to send a message         Future  Appointments   Date Time Provider Department Center   5/13/2025  8:30 AM [place] [facility] ROOM 01 [facility]          SUBJECTIVE  CC: Sore Throat (Sore thraot , deep cough , ear pain is bad , eyes hurt stared Sunday - she took home test flu /covid  this morning flu B was positve)      HPI:  Eugenia Francis is a 34 y.o. female who presents for an acute appointment.  Patient states that since Sunday she has been having ear pain deep cough and a sore throat.  Tested this morning positive for flu B.  Given that her son recently had flu.  Patient states that she feels very rundown.  Aside from being sick today the patient states that she feels like overall her general health is not well.  Feels like she has chronic fatigue and constantly struggling with her immune system.  Patient states that she has been complaining about this for several years.  Unfortunately did not get the blood work several years ago and would like to proceed forward and possibly add an KELIN testing.  Review of Systems   Constitutional:  Negative for activity change, appetite change, chills, fatigue and fever.   HENT:  Positive for congestion, ear pain, sore throat and trouble swallowing. Negative for drooling and ear discharge.    Respiratory:  Positive for cough. Negative for chest tightness, shortness of breath and stridor.    Cardiovascular:  Negative for chest pain and leg swelling.   Gastrointestinal:  Negative for abdominal distention, abdominal pain, constipation, diarrhea, nausea and vomiting.   Musculoskeletal:  Positive for neck pain.   Neurological:  Positive for headaches.   All other systems reviewed and are negative.      Historical Information   The patient history was reviewed as follows:  Past Medical History:   Diagnosis Date   • Allergic    • Anxiety    • Endometriosis 2011   • Gestational diabetes 01/27/2020    during pregnancy   • Kidney stone          Medications:     Current Outpatient Medications:   •  albuterol  (Proventil HFA) 90 mcg/act inhaler, Inhale 2 puffs every 6 (six) hours as needed for wheezing, Disp: 6.7 g, Rfl: 5  •  ALPRAZolam (XANAX) 0.25 mg tablet, TAKE 1 TABLET BY MOUTH AS NEEDED FOR ANXIETY, Disp: 30 tablet, Rfl: 0  •  amitriptyline (ELAVIL) 25 mg tablet, Take 1 tablet (25 mg total) by mouth daily at bedtime, Disp: 90 tablet, Rfl: 3  •  amoxicillin-clavulanate (AUGMENTIN) 875-125 mg per tablet, Take 1 tablet by mouth every 12 (twelve) hours for 7 days, Disp: 14 tablet, Rfl: 0  •  Dupixent 300 MG/2ML SOAJ, , Disp: , Rfl:   •  escitalopram (LEXAPRO) 20 mg tablet, TAKE 1 TABLET BY MOUTH EVERYDAY AT BEDTIME, Disp: 90 tablet, Rfl: 1  •  fluconazole (DIFLUCAN) 150 mg tablet, Take 1 tablet now and take 1 tablet in 3 days, Disp: 2 tablet, Rfl: 0  •  fluticasone (FLONASE) 50 mcg/act nasal spray, SPRAY 1 SPRAY INTO EACH NOSTRIL EVERY DAY, Disp: 16 mL, Rfl: 1  •  hydrocortisone 2.5 % ointment, APPLY A THIN LAYER TO THE AFFECTED AREA(S) 2 TIMES A DAY MON FRI, STOP WHEN CLEAR, Disp: , Rfl:   •  hydrOXYzine HCL (ATARAX) 25 mg tablet, TAKE 1/2 TO 1 TABLET 30 MINUTES BEFORE SLEEP, Disp: 90 tablet, Rfl: 2  •  Lo Loestrin Fe 1 MG-10 MCG / 10 MCG TABS, Take 1 tablet by mouth daily, Disp: , Rfl:   •  predniSONE 10 mg tablet, Take 2 tablets (20 mg total) by mouth daily for 5 days, THEN 1 tablet (10 mg total) daily for 5 days, THEN 0.5 tablets (5 mg total) daily for 5 days., Disp: 18 tablet, Rfl: 0  •  triamcinolone (KENALOG) 0.1 % cream, Apply topically 2 (two) times a day, Disp: , Rfl:   •  triamcinolone (KENALOG) 0.1 % ointment, PLEASE SEE ATTACHED FOR DETAILED DIRECTIONS, Disp: , Rfl:   •  valACYclovir (VALTREX) 500 mg tablet, Take 500 mg by mouth as needed, Disp: , Rfl:   •  methylPREDNISolone 4 MG tablet therapy pack, Use as directed on package (Patient not taking: Reported on 11/20/2024), Disp: 21 each, Rfl: 0  •  methylPREDNISolone 4 MG tablet therapy pack, Use as directed on package (Patient not taking: Reported on  "12/5/2024), Disp: 21 each, Rfl: 0    Allergies   Allergen Reactions   • Cefdinir Hives   • Corylus Hives   • Nuts - Food Allergy Facial Swelling and Hives       OBJECTIVE  Vitals:   Vitals:    04/02/25 0836   BP: 112/88   BP Location: Right arm   Patient Position: Sitting   Cuff Size: Standard   Pulse: (!) 125   Resp: 16   Temp: 97.9 °F (36.6 °C)   TempSrc: Temporal   SpO2: 98%   Weight: 61.4 kg (135 lb 6.4 oz)   Height: 5' 1\" (1.549 m)         Physical Exam  Vitals reviewed.   Constitutional:       Appearance: She is well-developed.   HENT:      Head: Normocephalic and atraumatic.      Right Ear: Tenderness present. A middle ear effusion is present. Tympanic membrane is erythematous.      Mouth/Throat:      Pharynx: Posterior oropharyngeal erythema present.   Eyes:      Conjunctiva/sclera: Conjunctivae normal.      Pupils: Pupils are equal, round, and reactive to light.   Cardiovascular:      Rate and Rhythm: Normal rate and regular rhythm.      Heart sounds: Normal heart sounds, S1 normal and S2 normal. No murmur heard.  Pulmonary:      Effort: Pulmonary effort is normal. No respiratory distress.      Breath sounds: Normal breath sounds. No wheezing.   Musculoskeletal:         General: Normal range of motion.      Cervical back: Normal range of motion and neck supple.   Skin:     General: Skin is warm.   Neurological:      Mental Status: She is alert and oriented to person, place, and time.   Psychiatric:         Speech: Speech normal.         Behavior: Behavior normal.         Thought Content: Thought content normal.         Judgment: Judgment normal.                    Jeanie Guillen MD,   [facility]  4/2/2025      Answers submitted by the patient for this visit:  Sore Throat Questionnaire (Submitted on 4/1/2025)  Chief Complaint: Sore throat  Chronicity: new  Onset: in the past 7 days  Progression since onset: gradually worsening  Pain worse on: neither  Fever: no " fever  pain severity now: severe  Pain - numeric: 10/10  hoarse voice: Yes  plugged ear sensation: Yes  swollen glands: No

## 2025-04-07 DIAGNOSIS — J40 BRONCHITIS: Primary | ICD-10-CM

## 2025-04-07 RX ORDER — BUDESONIDE AND FORMOTEROL FUMARATE DIHYDRATE 160; 4.5 UG/1; UG/1
2 AEROSOL RESPIRATORY (INHALATION) 2 TIMES DAILY
Qty: 10.2 G | Refills: 5 | Status: SHIPPED | OUTPATIENT
Start: 2025-04-07

## 2025-04-20 DIAGNOSIS — B37.9 YEAST INFECTION: ICD-10-CM

## 2025-04-22 RX ORDER — FLUCONAZOLE 150 MG/1
TABLET ORAL
Qty: 2 TABLET | Refills: 0 | Status: SHIPPED | OUTPATIENT
Start: 2025-04-22 | End: 2025-04-24

## 2025-04-29 LAB
25(OH)D3+25(OH)D2 SERPL-MCNC: 13.9 NG/ML (ref 30–100)
ALBUMIN SERPL-MCNC: 4.7 G/DL (ref 3.9–4.9)
ALP SERPL-CCNC: 82 IU/L (ref 44–121)
ALT SERPL-CCNC: 11 IU/L (ref 0–32)
ANA TITR SER IF: NEGATIVE {TITER}
AST SERPL-CCNC: 16 IU/L (ref 0–40)
BASOPHILS # BLD AUTO: 0.1 X10E3/UL (ref 0–0.2)
BASOPHILS NFR BLD AUTO: 1 %
BILIRUB SERPL-MCNC: 0.7 MG/DL (ref 0–1.2)
BUN SERPL-MCNC: 14 MG/DL (ref 6–20)
BUN/CREAT SERPL: 19 (ref 9–23)
CALCIUM SERPL-MCNC: 9.6 MG/DL (ref 8.7–10.2)
CHLORIDE SERPL-SCNC: 102 MMOL/L (ref 96–106)
CHOLEST SERPL-MCNC: 250 MG/DL (ref 100–199)
CHOLEST/HDLC SERPL: 2.8 RATIO (ref 0–4.4)
CO2 SERPL-SCNC: 20 MMOL/L (ref 20–29)
CREAT SERPL-MCNC: 0.73 MG/DL (ref 0.57–1)
EGFR: 110 ML/MIN/1.73
EOSINOPHIL # BLD AUTO: 0.1 X10E3/UL (ref 0–0.4)
EOSINOPHIL NFR BLD AUTO: 2 %
ERYTHROCYTE [DISTWIDTH] IN BLOOD BY AUTOMATED COUNT: 11.9 % (ref 11.7–15.4)
ERYTHROCYTE [SEDIMENTATION RATE] IN BLOOD BY WESTERGREN METHOD: 19 MM/HR (ref 0–32)
FERRITIN SERPL-MCNC: 55 NG/ML (ref 15–150)
GLOBULIN SER-MCNC: 2.8 G/DL (ref 1.5–4.5)
GLUCOSE SERPL-MCNC: 93 MG/DL (ref 70–99)
HCT VFR BLD AUTO: 43 % (ref 34–46.6)
HDLC SERPL-MCNC: 90 MG/DL
HGB BLD-MCNC: 14.3 G/DL (ref 11.1–15.9)
IMM GRANULOCYTES # BLD: 0 X10E3/UL (ref 0–0.1)
IMM GRANULOCYTES NFR BLD: 0 %
IRON SATN MFR SERPL: 19 % (ref 15–55)
IRON SERPL-MCNC: 92 UG/DL (ref 27–159)
LABORATORY COMMENT REPORT: NORMAL
LDLC SERPL CALC-MCNC: 137 MG/DL (ref 0–99)
LYMPHOCYTES # BLD AUTO: 2.2 X10E3/UL (ref 0.7–3.1)
LYMPHOCYTES NFR BLD AUTO: 31 %
MCH RBC QN AUTO: 30 PG (ref 26.6–33)
MCHC RBC AUTO-ENTMCNC: 33.3 G/DL (ref 31.5–35.7)
MCV RBC AUTO: 90 FL (ref 79–97)
MONOCYTES # BLD AUTO: 0.3 X10E3/UL (ref 0.1–0.9)
MONOCYTES NFR BLD AUTO: 4 %
NEUTROPHILS # BLD AUTO: 4.4 X10E3/UL (ref 1.4–7)
NEUTROPHILS NFR BLD AUTO: 62 %
PLATELET # BLD AUTO: 357 X10E3/UL (ref 150–450)
POTASSIUM SERPL-SCNC: 4 MMOL/L (ref 3.5–5.2)
PROT SERPL-MCNC: 7.5 G/DL (ref 6–8.5)
RBC # BLD AUTO: 4.76 X10E6/UL (ref 3.77–5.28)
SL AMB VLDL CHOLESTEROL CALC: 23 MG/DL (ref 5–40)
SODIUM SERPL-SCNC: 139 MMOL/L (ref 134–144)
TIBC SERPL-MCNC: 475 UG/DL (ref 250–450)
TRIGL SERPL-MCNC: 134 MG/DL (ref 0–149)
TSH SERPL DL<=0.005 MIU/L-ACNC: 1.25 UIU/ML (ref 0.45–4.5)
UIBC SERPL-MCNC: 383 UG/DL (ref 131–425)
VIT B12 SERPL-MCNC: 360 PG/ML (ref 232–1245)
WBC # BLD AUTO: 7.1 X10E3/UL (ref 3.4–10.8)

## 2025-04-30 ENCOUNTER — RESULTS FOLLOW-UP (OUTPATIENT)
Dept: FAMILY MEDICINE CLINIC | Facility: CLINIC | Age: 35
End: 2025-04-30

## 2025-04-30 DIAGNOSIS — E55.9 VITAMIN D DEFICIENCY: Primary | ICD-10-CM

## 2025-04-30 DIAGNOSIS — R53.82 CHRONIC FATIGUE: ICD-10-CM

## 2025-04-30 DIAGNOSIS — E53.8 B12 DEFICIENCY: ICD-10-CM

## 2025-04-30 RX ORDER — ERGOCALCIFEROL 1.25 MG/1
50000 CAPSULE, LIQUID FILLED ORAL WEEKLY
Qty: 8 CAPSULE | Refills: 0 | Status: SHIPPED | OUTPATIENT
Start: 2025-04-30 | End: 2025-06-22

## 2025-05-04 DIAGNOSIS — F41.9 ANXIETY: ICD-10-CM

## 2025-05-04 DIAGNOSIS — F41.0 PANIC DISORDER: ICD-10-CM

## 2025-05-05 DIAGNOSIS — F41.0 PANIC DISORDER: ICD-10-CM

## 2025-05-05 DIAGNOSIS — F41.9 ANXIETY: ICD-10-CM

## 2025-05-05 RX ORDER — ALPRAZOLAM 0.25 MG
0.25 TABLET ORAL AS NEEDED
Qty: 30 TABLET | Refills: 0 | Status: SHIPPED | OUTPATIENT
Start: 2025-05-05

## 2025-06-02 ENCOUNTER — OFFICE VISIT (OUTPATIENT)
Dept: FAMILY MEDICINE CLINIC | Facility: CLINIC | Age: 35
End: 2025-06-02
Payer: COMMERCIAL

## 2025-06-02 VITALS
TEMPERATURE: 96.7 F | WEIGHT: 140 LBS | RESPIRATION RATE: 12 BRPM | DIASTOLIC BLOOD PRESSURE: 80 MMHG | HEART RATE: 88 BPM | OXYGEN SATURATION: 98 % | SYSTOLIC BLOOD PRESSURE: 110 MMHG | HEIGHT: 61 IN | BODY MASS INDEX: 26.43 KG/M2

## 2025-06-02 DIAGNOSIS — J32.9 OTHER SINUSITIS, UNSPECIFIED CHRONICITY: Primary | ICD-10-CM

## 2025-06-02 DIAGNOSIS — B37.9 YEAST INFECTION: ICD-10-CM

## 2025-06-02 PROCEDURE — 99213 OFFICE O/P EST LOW 20 MIN: CPT | Performed by: FAMILY MEDICINE

## 2025-06-02 RX ORDER — METHYLPREDNISOLONE 4 MG/1
TABLET ORAL
Qty: 21 EACH | Refills: 0 | Status: SHIPPED | OUTPATIENT
Start: 2025-06-02

## 2025-06-02 RX ORDER — FLUCONAZOLE 150 MG/1
TABLET ORAL
Qty: 2 TABLET | Refills: 0 | Status: SHIPPED | OUTPATIENT
Start: 2025-06-02 | End: 2025-06-06

## 2025-06-02 RX ORDER — POLYMYXIN B SULFATE AND TRIMETHOPRIM 1; 10000 MG/ML; [USP'U]/ML
1 SOLUTION OPHTHALMIC EVERY 4 HOURS
Qty: 10 ML | Refills: 0 | Status: SHIPPED | OUTPATIENT
Start: 2025-06-02 | End: 2025-06-07

## 2025-06-02 NOTE — PROGRESS NOTES
Name: Eugenia Francis      : 1990      MRN: [id number]  Encounter Provider: Jeanie Guillen MD  Encounter Date: 2025   Encounter department: [facility]    Assessment & Plan  Other sinusitis, unspecified chronicity      - Started on Augment BID x 7days; Medrol pack  - Use Flonase 1 spray in each nostril for 7 days  - Start taking Claritin, Zyrtec, or Allegra for 7 days  - Encourage to take hot shower to help with congestion.  - Antibiotics given for eyes given erythemic area    If symptoms worsen to please contact the office.     Orders:  •  amoxicillin-clavulanate (AUGMENTIN) 875-125 mg per tablet; Take 1 tablet by mouth every 12 (twelve) hours for 7 days  •  polymyxin b-trimethoprim (POLYTRIM) ophthalmic solution; Administer 1 drop to the right eye every 4 (four) hours for 5 days  •  methylPREDNISolone 4 MG tablet therapy pack; Use as directed on package    Yeast infection  Prophylactic  Orders:  •  fluconazole (DIFLUCAN) 150 mg tablet; Take 1 tablet now and take 1 tablet in 3 days         History of Present Illness     HPI  34-year-old female presenting to the office patient states Wednesday started with a virus infection.  Now slightly feels like it is worsening.  Feels like she has nasal congestion slight stomach pains and drainage in both eyes.  Patient does not know if she truly has pinkeye or if it is all connected.      Review of Systems   HENT:  Positive for congestion, sinus pressure and sinus pain.    Eyes:  Positive for discharge and redness.     Past Medical History[1]  Past Surgical History[2]  Family History[3]  Social History[4]  Medications[5]  Allergies   Allergen Reactions   • A-G Pro Hives   • [place] Oil - Food Allergy Hives   • Cefdinir Hives   • Corylus Hives   • Nuts - Food Allergy Facial Swelling and Hives     Immunization History   Administered Date(s) Administered   • COVID-19 PFIZER VACCINE 0.3 ML IM 2021, 2021, 2022   •  "Influenza Quadrivalent Preservative Free 3 years and older IM 11/04/2021     Objective   /80 (BP Location: Left arm, Patient Position: Sitting, Cuff Size: Standard)   Pulse 88   Temp (!) 96.7 °F (35.9 °C) (Temporal)   Resp 12   Ht 5' 1\" (1.549 m)   Wt 63.5 kg (140 lb)   SpO2 98%   BMI 26.45 kg/m²     Physical Exam  Constitutional:       Appearance: She is well-developed.   HENT:      Head: Normocephalic and atraumatic.      Right Ear: A middle ear effusion is present.      Left Ear: A middle ear effusion is present.      Mouth/Throat:      Pharynx: Posterior oropharyngeal erythema present.     Eyes:      Conjunctiva/sclera:      Right eye: Right conjunctiva is injected.      Left eye: Left conjunctiva is injected.     Pulmonary:      Effort: Pulmonary effort is normal.     Musculoskeletal:         General: Normal range of motion.     Neurological:      Mental Status: She is alert and oriented to person, place, and time.     Psychiatric:         Behavior: Behavior normal.         Thought Content: Thought content normal.         Judgment: Judgment normal.                [1]  Past Medical History:  Diagnosis Date   • Allergic    • Anxiety    • Endometriosis 2011   • Gestational diabetes 01/27/2020    during pregnancy   • Kidney stone    [2]  Past Surgical History:  Procedure Laterality Date   • SEPTOPLASTY  2007    Limited FESS   [3]  No family history on file.[4]  Social History  Tobacco Use   • Smoking status: Never   • Smokeless tobacco: Never   Vaping Use   • Vaping status: Never Used   Substance and Sexual Activity   • Alcohol use: Yes     Comment: Socially   • Drug use: Never   • Sexual activity: Yes     Partners: Male     Birth control/protection: Other   [5]  Current Outpatient Medications on File Prior to Visit   Medication Sig   • albuterol (Proventil HFA) 90 mcg/act inhaler Inhale 2 puffs every 6 (six) hours as needed for wheezing   • ALPRAZolam (XANAX) 0.25 mg tablet TAKE 1 TABLET BY MOUTH AS " NEEDED FOR ANXIETY   • amitriptyline (ELAVIL) 25 mg tablet Take 1 tablet (25 mg total) by mouth daily at bedtime   • budesonide-formoterol (SYMBICORT) 160-4.5 mcg/act inhaler Inhale 2 puffs 2 (two) times a day Rinse mouth after use.   • Dupixent 300 MG/2ML SOAJ    • ergocalciferol (VITAMIN D2) 50,000 units Take 1 capsule (50,000 Units total) by mouth once a week for 8 doses Once completed please start taking OTC D3 2000 units daily   • escitalopram (LEXAPRO) 20 mg tablet TAKE 1 TABLET BY MOUTH EVERYDAY AT BEDTIME   • fluticasone (FLONASE) 50 mcg/act nasal spray SPRAY 1 SPRAY INTO EACH NOSTRIL EVERY DAY   • hydrocortisone 2.5 % ointment    • hydrOXYzine HCL (ATARAX) 25 mg tablet TAKE 1/2 TO 1 TABLET 30 MINUTES BEFORE SLEEP   • Lo Loestrin Fe 1 MG-10 MCG / 10 MCG TABS Take 1 tablet by mouth in the morning.   • triamcinolone (KENALOG) 0.1 % cream Apply topically in the morning and in the evening.   • triamcinolone (KENALOG) 0.1 % ointment    • valACYclovir (VALTREX) 500 mg tablet Take 500 mg by mouth as needed   • ALPRAZolam (XANAX) 0.25 mg tablet Take 1 tablet (0.25 mg total) by mouth as needed for anxiety (Patient not taking: Reported on 6/2/2025)   • methylPREDNISolone 4 MG tablet therapy pack Use as directed on package (Patient not taking: Reported on 11/20/2024)   • methylPREDNISolone 4 MG tablet therapy pack Use as directed on package (Patient not taking: Reported on 12/5/2024)   • predniSONE 10 mg tablet Take 2 tablets (20 mg total) by mouth daily for 5 days, THEN 1 tablet (10 mg total) daily for 5 days, THEN 0.5 tablets (5 mg total) daily for 5 days. (Patient not taking: Reported on 6/2/2025)

## 2025-06-07 ENCOUNTER — DOCUMENTATION (OUTPATIENT)
Dept: FAMILY MEDICINE CLINIC | Facility: CLINIC | Age: 35
End: 2025-06-07

## 2025-06-07 ENCOUNTER — NURSE TRIAGE (OUTPATIENT)
Dept: OTHER | Facility: OTHER | Age: 35
End: 2025-06-07

## 2025-06-07 DIAGNOSIS — H10.9 BACTERIAL CONJUNCTIVITIS OF RIGHT EYE: Primary | ICD-10-CM

## 2025-06-07 RX ORDER — MOXIFLOXACIN 5 MG/ML
1 SOLUTION/ DROPS OPHTHALMIC 3 TIMES DAILY
Qty: 3 ML | Refills: 0 | Status: SHIPPED | OUTPATIENT
Start: 2025-06-07

## 2025-06-07 NOTE — TELEPHONE ENCOUNTER
"Regarding: eye irritation  ----- Message from Mary Lou NOLASCO sent at 6/7/2025  8:53 AM EDT -----  Patient stated, \"I saw Dr. Raymond on Monday for a sinus and eye infection. I was given eye drops and this is my last day. However, both my eyes are still insanely red and uncomfortable, dry and itchy. There is a lot of green junk. I was wondering if there is another medication I can be prescribed.\"    "

## 2025-06-07 NOTE — TELEPHONE ENCOUNTER
"REASON FOR CONVERSATION: Eye Problem    SYMPTOMS: On going b/l thick green discharge from eyes, red sclera.  URI symptoms improved    OTHER HEALTH INFORMATION: N/A    PROTOCOL DISPOSITION: Home Care, Call PCP Now    CARE ADVICE PROVIDED: On call provider prescribed alternative eye drop.  Continue use of allergy meds.  Follow up with office if symptoms not improved by Monday.  Information relayed to patient; verbalized understanding.    PRACTICE FOLLOW-UP: N/A          Answer Assessment - Initial Assessment Questions  1. EYE DISCHARGE: \"Is the discharge in one or both eyes?\" \"What color is it?\" \"How much is there?\" \"When did the discharge start?\"         Both eyes  Look and discharge similar to Monday's symptoms  Thick green discharge    2. REDNESS OF SCLERA: \"Is there redness in the white of the eye?\" If Yes, ask: \"Is it in one or both eyes?\" \"When did the redness start?\"        Red bilaterally    3. EYELIDS: \"Are the eyelids red or swollen?\" If Yes, ask: \"How much?\"         Denies    4. VISION: \"Do you have blurred vision?\"        Denies    5. PAIN: \"Is there any pain?\" If Yes, ask: \"How bad is the pain?\" (Scale 0-10; or none, mild, moderate, severe)        Slight pain, more itchy    6. CONTACT LENS: \"Do you wear contacts?\"        Denies    7. OTHER SYMPTOMS: \"Do you have any other symptoms?\" (e.g., fever, runny nose, cough)        URI symptoms are improved  Denies fever    8. PREGNANCY: \"Is there any chance you are pregnant?\" \"When was your last menstrual period?\"       Denies    Protocols used: Eye - Pus or Discharge-Adult-AH    "

## 2025-06-07 NOTE — TELEPHONE ENCOUNTER
Reason for Disposition  • [1] Bacterial conjunctivitis suspected (e.g., white, yellow or green discharge nearly all day long; or eyelids stuck shut from discharge after sleeping) AND [2] doctor (or NP/PA) standing order to call in antibiotic eye drops    Protocols used: Eye - Pus or Discharge-Adult-AH

## 2025-06-10 ENCOUNTER — TELEPHONE (OUTPATIENT)
Age: 35
End: 2025-06-10

## 2025-06-10 NOTE — TELEPHONE ENCOUNTER
Patient called states new eye drops Moxifloxacin are working great, would like to know how may days she is to be using drops. Please advise and call patient

## 2025-06-15 DIAGNOSIS — F41.9 ANXIETY: ICD-10-CM

## 2025-06-15 DIAGNOSIS — F41.0 PANIC DISORDER: ICD-10-CM

## 2025-06-15 RX ORDER — ESCITALOPRAM OXALATE 20 MG/1
20 TABLET ORAL
Qty: 90 TABLET | Refills: 1 | Status: SHIPPED | OUTPATIENT
Start: 2025-06-15

## 2025-06-22 DIAGNOSIS — E55.9 VITAMIN D DEFICIENCY: ICD-10-CM

## 2025-06-22 RX ORDER — ERGOCALCIFEROL 1.25 MG/1
50000 CAPSULE, LIQUID FILLED ORAL WEEKLY
Qty: 4 CAPSULE | Refills: 1 | Status: SHIPPED | OUTPATIENT
Start: 2025-06-22 | End: 2025-08-11

## 2025-08-15 ENCOUNTER — OFFICE VISIT (OUTPATIENT)
Dept: FAMILY MEDICINE CLINIC | Facility: CLINIC | Age: 35
End: 2025-08-15
Payer: COMMERCIAL

## 2025-08-15 VITALS
TEMPERATURE: 98.5 F | HEIGHT: 61 IN | WEIGHT: 141 LBS | RESPIRATION RATE: 12 BRPM | OXYGEN SATURATION: 97 % | DIASTOLIC BLOOD PRESSURE: 90 MMHG | SYSTOLIC BLOOD PRESSURE: 128 MMHG | HEART RATE: 113 BPM | BODY MASS INDEX: 26.62 KG/M2

## 2025-08-15 DIAGNOSIS — J01.00 ACUTE MAXILLARY SINUSITIS, RECURRENCE NOT SPECIFIED: Primary | ICD-10-CM

## 2025-08-15 DIAGNOSIS — R05.9 COUGH, UNSPECIFIED TYPE: ICD-10-CM

## 2025-08-15 PROCEDURE — 99213 OFFICE O/P EST LOW 20 MIN: CPT | Performed by: FAMILY MEDICINE

## 2025-08-15 RX ORDER — AZITHROMYCIN 250 MG/1
TABLET, FILM COATED ORAL
Qty: 6 TABLET | Refills: 0 | Status: SHIPPED | OUTPATIENT
Start: 2025-08-15 | End: 2025-08-20

## 2025-08-15 RX ORDER — PREDNISONE 20 MG/1
40 TABLET ORAL DAILY
Qty: 10 TABLET | Refills: 0 | Status: SHIPPED | OUTPATIENT
Start: 2025-08-15 | End: 2025-08-20

## 2025-08-21 DIAGNOSIS — B00.9 HERPES: Primary | ICD-10-CM

## 2025-08-22 RX ORDER — VALACYCLOVIR HYDROCHLORIDE 500 MG/1
500 TABLET, FILM COATED ORAL DAILY
Qty: 90 TABLET | Refills: 0 | Status: SHIPPED | OUTPATIENT
Start: 2025-08-22 | End: 2025-11-20